# Patient Record
Sex: FEMALE | Race: WHITE
[De-identification: names, ages, dates, MRNs, and addresses within clinical notes are randomized per-mention and may not be internally consistent; named-entity substitution may affect disease eponyms.]

---

## 2017-07-22 ENCOUNTER — HOSPITAL ENCOUNTER (EMERGENCY)
Dept: HOSPITAL 8 - ED | Age: 22
Discharge: HOME | End: 2017-07-22
Payer: MEDICAID

## 2017-07-22 VITALS — SYSTOLIC BLOOD PRESSURE: 124 MMHG | DIASTOLIC BLOOD PRESSURE: 73 MMHG

## 2017-07-22 VITALS — BODY MASS INDEX: 28.65 KG/M2 | HEIGHT: 65 IN | WEIGHT: 171.96 LBS

## 2017-07-22 DIAGNOSIS — O26.891: Primary | ICD-10-CM

## 2017-07-22 DIAGNOSIS — M94.0: ICD-10-CM

## 2017-07-22 LAB — BUN SERPL-MCNC: 9 MG/DL (ref 7–18)

## 2017-07-22 PROCEDURE — 80048 BASIC METABOLIC PNL TOTAL CA: CPT

## 2017-07-22 PROCEDURE — 84703 CHORIONIC GONADOTROPIN ASSAY: CPT

## 2017-07-22 PROCEDURE — 82040 ASSAY OF SERUM ALBUMIN: CPT

## 2017-07-22 PROCEDURE — 84484 ASSAY OF TROPONIN QUANT: CPT

## 2017-07-22 PROCEDURE — 85379 FIBRIN DEGRADATION QUANT: CPT

## 2017-07-22 PROCEDURE — 71010: CPT

## 2017-07-22 PROCEDURE — 93005 ELECTROCARDIOGRAM TRACING: CPT

## 2017-07-22 PROCEDURE — 36415 COLL VENOUS BLD VENIPUNCTURE: CPT

## 2017-07-22 PROCEDURE — 85025 COMPLETE CBC W/AUTO DIFF WBC: CPT

## 2017-12-27 ENCOUNTER — NON-PROVIDER VISIT (OUTPATIENT)
Dept: OCCUPATIONAL MEDICINE | Facility: CLINIC | Age: 22
End: 2017-12-27

## 2017-12-27 DIAGNOSIS — Z11.1 ENCOUNTER FOR PPD TEST: ICD-10-CM

## 2017-12-27 PROCEDURE — 86580 TB INTRADERMAL TEST: CPT | Performed by: PREVENTIVE MEDICINE

## 2017-12-30 ENCOUNTER — NON-PROVIDER VISIT (OUTPATIENT)
Dept: URGENT CARE | Facility: CLINIC | Age: 22
End: 2017-12-30

## 2017-12-30 LAB — TB WHEAL 3D P 5 TU DIAM: NORMAL MM

## 2017-12-30 NOTE — PROGRESS NOTES
Tricia Rees is a 22 y.o. female here for a non-provider visit for PPD reading -- Step 1 of 2.      1.  Resulted in Epic under enter/edit results? Yes   2.  TB evaluation questionnaire scanned into chart and original given to patient?Yes      3. Was induration greater than 0 mm? No.    If Step 1 of 2, when is patient returning for second step (delete if N/A): 1/3/18    Routed to PCP? No

## 2018-01-04 ENCOUNTER — NON-PROVIDER VISIT (OUTPATIENT)
Dept: OCCUPATIONAL MEDICINE | Facility: CLINIC | Age: 23
End: 2018-01-04

## 2018-01-04 DIAGNOSIS — Z11.1 PPD SCREENING TEST: ICD-10-CM

## 2018-01-04 PROCEDURE — 86580 TB INTRADERMAL TEST: CPT | Performed by: PREVENTIVE MEDICINE

## 2018-01-05 PROCEDURE — 99285 EMERGENCY DEPT VISIT HI MDM: CPT

## 2018-01-06 ENCOUNTER — HOSPITAL ENCOUNTER (EMERGENCY)
Facility: MEDICAL CENTER | Age: 23
End: 2018-01-06
Attending: EMERGENCY MEDICINE
Payer: MEDICAID

## 2018-01-06 ENCOUNTER — APPOINTMENT (OUTPATIENT)
Dept: RADIOLOGY | Facility: MEDICAL CENTER | Age: 23
End: 2018-01-06
Attending: EMERGENCY MEDICINE
Payer: MEDICAID

## 2018-01-06 VITALS
HEART RATE: 73 BPM | RESPIRATION RATE: 20 BRPM | OXYGEN SATURATION: 99 % | HEIGHT: 65 IN | BODY MASS INDEX: 30.45 KG/M2 | TEMPERATURE: 98.1 F | DIASTOLIC BLOOD PRESSURE: 78 MMHG | WEIGHT: 182.76 LBS | SYSTOLIC BLOOD PRESSURE: 137 MMHG

## 2018-01-06 DIAGNOSIS — M62.838 MUSCLE SPASMS OF NECK: ICD-10-CM

## 2018-01-06 DIAGNOSIS — G44.209 TENSION HEADACHE: ICD-10-CM

## 2018-01-06 LAB
ALBUMIN SERPL BCP-MCNC: 4.2 G/DL (ref 3.2–4.9)
ALBUMIN/GLOB SERPL: 1.2 G/DL
ALP SERPL-CCNC: 55 U/L (ref 30–99)
ALT SERPL-CCNC: 16 U/L (ref 2–50)
AMPHET UR QL SCN: NEGATIVE
ANION GAP SERPL CALC-SCNC: 7 MMOL/L (ref 0–11.9)
APPEARANCE UR: ABNORMAL
AST SERPL-CCNC: 15 U/L (ref 12–45)
BACTERIA #/AREA URNS HPF: ABNORMAL /HPF
BARBITURATES UR QL SCN: NEGATIVE
BASOPHILS # BLD AUTO: 0.6 % (ref 0–1.8)
BASOPHILS # BLD: 0.07 K/UL (ref 0–0.12)
BENZODIAZ UR QL SCN: NEGATIVE
BILIRUB SERPL-MCNC: 0.2 MG/DL (ref 0.1–1.5)
BILIRUB UR QL STRIP.AUTO: NEGATIVE
BUN SERPL-MCNC: 10 MG/DL (ref 8–22)
BURR CELLS/RBC NFR CSF MANUAL: 0 %
BURR CELLS/RBC NFR CSF MANUAL: 1 %
BZE UR QL SCN: NEGATIVE
CALCIUM SERPL-MCNC: 9.3 MG/DL (ref 8.5–10.5)
CANNABINOIDS UR QL SCN: POSITIVE
CHLORIDE SERPL-SCNC: 108 MMOL/L (ref 96–112)
CLARITY CSF: CLEAR
CLARITY CSF: CLEAR
CO2 SERPL-SCNC: 23 MMOL/L (ref 20–33)
COLOR CSF: COLORLESS
COLOR CSF: COLORLESS
COLOR SPUN CSF: COLORLESS
COLOR SPUN CSF: COLORLESS
COLOR UR: YELLOW
CREAT SERPL-MCNC: 0.56 MG/DL (ref 0.5–1.4)
CULTURE IF INDICATED INDCX: YES UA CULTURE
EOSINOPHIL # BLD AUTO: 0.17 K/UL (ref 0–0.51)
EOSINOPHIL NFR BLD: 1.5 % (ref 0–6.9)
EPI CELLS #/AREA URNS HPF: ABNORMAL /HPF
ERYTHROCYTE [DISTWIDTH] IN BLOOD BY AUTOMATED COUNT: 46 FL (ref 35.9–50)
GFR SERPL CREATININE-BSD FRML MDRD: >60 ML/MIN/1.73 M 2
GLOBULIN SER CALC-MCNC: 3.4 G/DL (ref 1.9–3.5)
GLUCOSE CSF-MCNC: 69 MG/DL (ref 40–80)
GLUCOSE SERPL-MCNC: 105 MG/DL (ref 65–99)
GLUCOSE UR STRIP.AUTO-MCNC: NEGATIVE MG/DL
GRAM STN SPEC: NORMAL
HCT VFR BLD AUTO: 39 % (ref 37–47)
HGB BLD-MCNC: 12.7 G/DL (ref 12–16)
HYALINE CASTS #/AREA URNS LPF: ABNORMAL /LPF
IMM GRANULOCYTES # BLD AUTO: 0.04 K/UL (ref 0–0.11)
IMM GRANULOCYTES NFR BLD AUTO: 0.3 % (ref 0–0.9)
KETONES UR STRIP.AUTO-MCNC: NEGATIVE MG/DL
LEUKOCYTE ESTERASE UR QL STRIP.AUTO: NEGATIVE
LYMPHOCYTES # BLD AUTO: 2.93 K/UL (ref 1–4.8)
LYMPHOCYTES NFR BLD: 25 % (ref 22–41)
LYMPHOCYTES NFR CSF: 89 %
MCH RBC QN AUTO: 27.5 PG (ref 27–33)
MCHC RBC AUTO-ENTMCNC: 32.6 G/DL (ref 33.6–35)
MCV RBC AUTO: 84.4 FL (ref 81.4–97.8)
METHADONE UR QL SCN: NEGATIVE
MICRO URNS: ABNORMAL
MONOCYTES # BLD AUTO: 0.75 K/UL (ref 0–0.85)
MONOCYTES NFR BLD AUTO: 6.4 % (ref 0–13.4)
MONONUC CELLS NFR CSF: 10 %
NEUTROPHILS # BLD AUTO: 7.75 K/UL (ref 2–7.15)
NEUTROPHILS NFR BLD: 66.2 % (ref 44–72)
NEUTROPHILS NFR CSF: 1 %
NITRITE UR QL STRIP.AUTO: NEGATIVE
NRBC # BLD AUTO: 0 K/UL
NRBC BLD-RTO: 0 /100 WBC
OPIATES UR QL SCN: POSITIVE
OXYCODONE UR QL SCN: NEGATIVE
PCP UR QL SCN: NEGATIVE
PH UR STRIP.AUTO: 5 [PH]
PLATELET # BLD AUTO: 303 K/UL (ref 164–446)
PMV BLD AUTO: 10.6 FL (ref 9–12.9)
POTASSIUM SERPL-SCNC: 3.9 MMOL/L (ref 3.6–5.5)
PROPOXYPH UR QL SCN: NEGATIVE
PROT CSF-MCNC: 34 MG/DL (ref 15–45)
PROT SERPL-MCNC: 7.6 G/DL (ref 6–8.2)
PROT UR QL STRIP: NEGATIVE MG/DL
RBC # BLD AUTO: 4.62 M/UL (ref 4.2–5.4)
RBC # CSF: 0 CELLS/UL
RBC # CSF: <1 CELLS/UL
RBC # URNS HPF: ABNORMAL /HPF
RBC UR QL AUTO: NEGATIVE
SIGNIFICANT IND 70042: NORMAL
SITE SITE: NORMAL
SODIUM SERPL-SCNC: 138 MMOL/L (ref 135–145)
SOURCE SOURCE: NORMAL
SP GR UR STRIP.AUTO: 1.03
SPECIMEN VOL CSF: 5 ML
SPECIMEN VOL CSF: 5 ML
TUBE # CSF: 1
TUBE # CSF: 4
UROBILINOGEN UR STRIP.AUTO-MCNC: 0.2 MG/DL
WBC # BLD AUTO: 11.7 K/UL (ref 4.8–10.8)
WBC # CSF: 2 CELLS/UL (ref 0–10)
WBC # CSF: <1 CELLS/UL (ref 0–10)
WBC #/AREA URNS HPF: ABNORMAL /HPF

## 2018-01-06 PROCEDURE — 36415 COLL VENOUS BLD VENIPUNCTURE: CPT

## 2018-01-06 PROCEDURE — 70450 CT HEAD/BRAIN W/O DYE: CPT

## 2018-01-06 PROCEDURE — 87070 CULTURE OTHR SPECIMN AEROBIC: CPT

## 2018-01-06 PROCEDURE — 96361 HYDRATE IV INFUSION ADD-ON: CPT | Mod: XU

## 2018-01-06 PROCEDURE — 62270 DX LMBR SPI PNXR: CPT

## 2018-01-06 PROCEDURE — 80053 COMPREHEN METABOLIC PANEL: CPT

## 2018-01-06 PROCEDURE — 96375 TX/PRO/DX INJ NEW DRUG ADDON: CPT | Mod: XU

## 2018-01-06 PROCEDURE — 89051 BODY FLUID CELL COUNT: CPT

## 2018-01-06 PROCEDURE — 84157 ASSAY OF PROTEIN OTHER: CPT

## 2018-01-06 PROCEDURE — 85025 COMPLETE CBC W/AUTO DIFF WBC: CPT

## 2018-01-06 PROCEDURE — 700105 HCHG RX REV CODE 258: Performed by: EMERGENCY MEDICINE

## 2018-01-06 PROCEDURE — 700101 HCHG RX REV CODE 250

## 2018-01-06 PROCEDURE — 81001 URINALYSIS AUTO W/SCOPE: CPT | Mod: 59

## 2018-01-06 PROCEDURE — 80307 DRUG TEST PRSMV CHEM ANLYZR: CPT

## 2018-01-06 PROCEDURE — 82945 GLUCOSE OTHER FLUID: CPT

## 2018-01-06 PROCEDURE — 99284 EMERGENCY DEPT VISIT MOD MDM: CPT

## 2018-01-06 PROCEDURE — 700111 HCHG RX REV CODE 636 W/ 250 OVERRIDE (IP): Performed by: EMERGENCY MEDICINE

## 2018-01-06 PROCEDURE — 87205 SMEAR GRAM STAIN: CPT

## 2018-01-06 PROCEDURE — 96374 THER/PROPH/DIAG INJ IV PUSH: CPT | Mod: XU

## 2018-01-06 PROCEDURE — 87086 URINE CULTURE/COLONY COUNT: CPT

## 2018-01-06 RX ORDER — SODIUM CHLORIDE 9 MG/ML
1000 INJECTION, SOLUTION INTRAVENOUS ONCE
Status: COMPLETED | OUTPATIENT
Start: 2018-01-06 | End: 2018-01-06

## 2018-01-06 RX ORDER — DEXAMETHASONE SODIUM PHOSPHATE 4 MG/ML
10 INJECTION, SOLUTION INTRA-ARTICULAR; INTRALESIONAL; INTRAMUSCULAR; INTRAVENOUS; SOFT TISSUE ONCE
Status: COMPLETED | OUTPATIENT
Start: 2018-01-06 | End: 2018-01-06

## 2018-01-06 RX ORDER — CYCLOBENZAPRINE HCL 10 MG
10 TABLET ORAL EVERY 8 HOURS PRN
Qty: 21 TAB | Refills: 0 | Status: SHIPPED | OUTPATIENT
Start: 2018-01-06 | End: 2018-01-07

## 2018-01-06 RX ORDER — ONDANSETRON 2 MG/ML
4 INJECTION INTRAMUSCULAR; INTRAVENOUS ONCE
Status: COMPLETED | OUTPATIENT
Start: 2018-01-06 | End: 2018-01-06

## 2018-01-06 RX ORDER — MORPHINE SULFATE 10 MG/ML
6 INJECTION, SOLUTION INTRAMUSCULAR; INTRAVENOUS ONCE
Status: COMPLETED | OUTPATIENT
Start: 2018-01-06 | End: 2018-01-06

## 2018-01-06 RX ORDER — LIDOCAINE HYDROCHLORIDE 20 MG/ML
INJECTION, SOLUTION INFILTRATION; PERINEURAL
Status: COMPLETED
Start: 2018-01-06 | End: 2018-01-06

## 2018-01-06 RX ORDER — HYDROMORPHONE HYDROCHLORIDE 2 MG/ML
1 INJECTION, SOLUTION INTRAMUSCULAR; INTRAVENOUS; SUBCUTANEOUS ONCE
Status: COMPLETED | OUTPATIENT
Start: 2018-01-06 | End: 2018-01-06

## 2018-01-06 RX ORDER — NAPROXEN 500 MG/1
500 TABLET ORAL 2 TIMES DAILY WITH MEALS
Qty: 20 TAB | Refills: 0 | Status: SHIPPED | OUTPATIENT
Start: 2018-01-06 | End: 2018-01-07

## 2018-01-06 RX ORDER — KETOROLAC TROMETHAMINE 30 MG/ML
30 INJECTION, SOLUTION INTRAMUSCULAR; INTRAVENOUS ONCE
Status: COMPLETED | OUTPATIENT
Start: 2018-01-06 | End: 2018-01-06

## 2018-01-06 RX ADMIN — DEXAMETHASONE SODIUM PHOSPHATE 10 MG: 4 INJECTION, SOLUTION INTRAMUSCULAR; INTRAVENOUS at 06:10

## 2018-01-06 RX ADMIN — HYDROMORPHONE HYDROCHLORIDE 1 MG: 2 INJECTION INTRAMUSCULAR; INTRAVENOUS; SUBCUTANEOUS at 04:20

## 2018-01-06 RX ADMIN — SODIUM CHLORIDE 1000 ML: 9 INJECTION, SOLUTION INTRAVENOUS at 02:02

## 2018-01-06 RX ADMIN — KETOROLAC TROMETHAMINE 30 MG: 30 INJECTION, SOLUTION INTRAMUSCULAR at 05:45

## 2018-01-06 RX ADMIN — ONDANSETRON 4 MG: 2 INJECTION INTRAMUSCULAR; INTRAVENOUS at 02:02

## 2018-01-06 RX ADMIN — MORPHINE SULFATE 6 MG: 10 INJECTION INTRAVENOUS at 02:02

## 2018-01-06 RX ADMIN — LIDOCAINE HYDROCHLORIDE: 20 INJECTION, SOLUTION INFILTRATION; PERINEURAL at 04:30

## 2018-01-06 NOTE — ED NOTES
D/C home with written and verbal instructions re: Rx, activity, f/u.  Verbalizes understanding.  Patient wheeled out to lobby. Family outside waiting for patient.

## 2018-01-06 NOTE — DISCHARGE INSTRUCTIONS
Muscle Cramps and Spasms  Muscle cramps and spasms occur when a muscle or muscles tighten and you have no control over this tightening (involuntary muscle contraction). They are a common problem and can develop in any muscle. The most common place is in the calf muscles of the leg. Both muscle cramps and muscle spasms are involuntary muscle contractions, but they also have differences:   · Muscle cramps are sporadic and painful. They may last a few seconds to a quarter of an hour. Muscle cramps are often more forceful and last longer than muscle spasms.  · Muscle spasms may or may not be painful. They may also last just a few seconds or much longer.  CAUSES   It is uncommon for cramps or spasms to be due to a serious underlying problem. In many cases, the cause of cramps or spasms is unknown. Some common causes are:   · Overexertion.    · Overuse from repetitive motions (doing the same thing over and over).    · Remaining in a certain position for a long period of time.    · Improper preparation, form, or technique while performing a sport or activity.    · Dehydration.    · Injury.    · Side effects of some medicines.    · Abnormally low levels of the salts and ions in your blood (electrolytes), especially potassium and calcium. This could happen if you are taking water pills (diuretics) or you are pregnant.    Some underlying medical problems can make it more likely to develop cramps or spasms. These include, but are not limited to:   · Diabetes.    · Parkinson disease.    · Hormone disorders, such as thyroid problems.    · Alcohol abuse.    · Diseases specific to muscles, joints, and bones.    · Blood vessel disease where not enough blood is getting to the muscles.    HOME CARE INSTRUCTIONS   · Stay well hydrated. Drink enough water and fluids to keep your urine clear or pale yellow.  · It may be helpful to massage, stretch, and relax the affected muscle.  · For tight or tense muscles, use a warm towel, heating  pad, or hot shower water directed to the affected area.  · If you are sore or have pain after a cramp or spasm, applying ice to the affected area may relieve discomfort.  ¨ Put ice in a plastic bag.  ¨ Place a towel between your skin and the bag.  ¨ Leave the ice on for 15-20 minutes, 03-04 times a day.  · Medicines used to treat a known cause of cramps or spasms may help reduce their frequency or severity. Only take over-the-counter or prescription medicines as directed by your caregiver.  SEEK MEDICAL CARE IF:   Your cramps or spasms get more severe, more frequent, or do not improve over time.   MAKE SURE YOU:   · Understand these instructions.  · Will watch your condition.  · Will get help right away if you are not doing well or get worse.     This information is not intended to replace advice given to you by your health care provider. Make sure you discuss any questions you have with your health care provider.     Document Released: 06/09/2003 Document Revised: 04/14/2014 Document Reviewed: 12/04/2013  [employer] Interactive Patient Education ©2016 Else[employer] Inc.      Tension Headache  A tension headache is pain, pressure, or aching felt over the front and sides of the head. Tension headaches often come after stress, feeling worried (anxiety), or feeling sad or down for a while (depressed).  HOME CARE  · Only take medicine as told by your doctor.  · Lie down in a dark, quiet room when you have a headache.  · Keep a journal to find out if certain things bring on headaches. For example, write down:  ¨ What you eat and drink.  ¨ How much sleep you get.  ¨ Any change to your diet or medicines.  · Relax by getting a massage or doing other relaxing activities.  · Put ice or heat packs on the head and neck area as told by your doctor.  · Lessen stress.  · Sit up straight. Do not tighten (tense) your muscles.  · Quit smoking if you smoke.  · Lessen how much alcohol you drink.  · Lessen how much caffeine you drink, or stop  drinking caffeine.  · Eat and exercise regularly.  · Get enough sleep.  · Avoid using too much pain medicine.  GET HELP RIGHT AWAY IF:   · Your headache becomes really bad.  · You have a fever.  · You have a stiff neck.  · You have trouble seeing.  · Your muscles are weak, or you lose muscle control.  · You lose your balance or have trouble walking.  · You feel like you will pass out (faint), or you pass out.  · You have really bad symptoms that are different than your first symptoms.  · You have problems with the medicines given to you by your doctor.  · Your medicines do not work.  · Your headache feels different than the other headaches.  · You feel sick to your stomach (nauseous) or throw up (vomit).     This information is not intended to replace advice given to you by your health care provider. Make sure you discuss any questions you have with your health care provider.     Document Released: 03/14/2011 Document Revised: 05/03/2016 Document Reviewed: 04/11/2016  [employer] Interactive Patient Education ©2016 Elsevier Inc.

## 2018-01-06 NOTE — ED PROVIDER NOTES
ED Provider Note    ED Provider Note      Primary care provider: Pcp Pt States None    CHIEF COMPLAINT  Chief Complaint   Patient presents with   • Headache   • Neck Pain   • Nausea       HPI  Tricia Rees is a 22 y.o. female who presents to the Emergency Department Chief complaint of headache. Patient also reports extreme neck pain and stiffness. Patient stated the headache began slow this morning given the occiput and then radiated down both sides of her neck. She now has pain with any movement of the neck. She reports no vision changes or hearing changes but does report phonophobia and photophobia. She's had minor nausea is had no emesis she denies fevers but has had chills throughout the day. She reports multiple sick contacts. No lower back pain she has no chest pain or shortness breath no abdominal pain, diarrhea dysuria hematuria and melena hematochezia she denies chance pregnancy at this time. Her pain currently is rated as an 8 out of 10 with no alleviating factors.  Patient reported that she did smoke some marijuana prior to arrival in an attempt to alleviate pain with no success.    REVIEW OF SYSTEMS  10 systems reviewed and otherwise negative, pertinent positives and negatives listed in the history of present illness.    PAST MEDICAL HISTORY   has a past medical history of  treated with cerclage at 14 weeks gestation (1/12/2012); HBsAg (hepatitis B surface antigen) positive, currently pregnant (1/18/2012); Incompetent cervix in pregnancy (1/12/2012); and Powell cerclage present (1/12/2012).    SURGICAL HISTORY   has a past surgical history that includes cervical cerclage (  10/2011); cervical cerclage (3/24/2012); and primary c section (3/25/2012).    SOCIAL HISTORY  Social History   Substance Use Topics   • Smoking status: Never Smoker   • Smokeless tobacco: Not on file   • Alcohol use No      History   Drug Use No       FAMILY HISTORY  Non-Contributory    CURRENT MEDICATIONS  Home Medications      "Reviewed by Katie Hurley R.N. (Registered Nurse) on 01/06/18 at 0121  Med List Status: Complete   Medication Last Dose Status   ibuprofen (MOTRIN) 800 MG Tab not taking Active                ALLERGIES  Allergies   Allergen Reactions   • Nkda [No Known Drug Allergy]        PHYSICAL EXAM  VITAL SIGNS: /78   Pulse 88   Temp 36.7 °C (98.1 °F)   Resp 16   Ht 1.651 m (5' 5\")   Wt 82.9 kg (182 lb 12.2 oz)   LMP 07/10/2011   SpO2 99%   BMI 30.41 kg/m²   Pulse ox interpretation: I interpret this pulse ox as normal.  Constitutional: Alert and oriented x 3, moderate Distress  HEENT: Atraumatic normocephalic, pupils are equal round reactive to light extraocular movements are intact. The nares is clear, external ears are normal, mouth shows dry mucous membranes  Neck: Head held in fixed forward position. Pain with flexion extension or axial rotation no midline tenderness. Off no JVD no tracheal deviation  Cardiovascular: Regular rate and rhythm no murmur rub or gallop 2+ pulses peripherally x4  Thorax & Lungs: No respiratory distress, no wheezes rales or rhonchi, No chest tenderness.   GI: Soft nontender nondistended positive bowel sounds, no peritoneal signs  Skin: Warm dry no acute rash or lesion  Musculoskeletal: Moving all extremities with full range and 5 of 5 strength, no acute  deformity  Neurologic: Cranial nerves III through XII are grossly intact, no sensory deficit, no cerebellar dysfunction   Psychiatric: Appropriate affect for situation at this time      DIAGNOSTIC STUDIES / PROCEDURES  LABS  Results for orders placed or performed during the hospital encounter of 01/06/18   CBC WITH DIFFERENTIAL   Result Value Ref Range    WBC 11.7 (H) 4.8 - 10.8 K/uL    RBC 4.62 4.20 - 5.40 M/uL    Hemoglobin 12.7 12.0 - 16.0 g/dL    Hematocrit 39.0 37.0 - 47.0 %    MCV 84.4 81.4 - 97.8 fL    MCH 27.5 27.0 - 33.0 pg    MCHC 32.6 (L) 33.6 - 35.0 g/dL    RDW 46.0 35.9 - 50.0 fL    Platelet Count 303 164 - 446 K/uL "    MPV 10.6 9.0 - 12.9 fL    Neutrophils-Polys 66.20 44.00 - 72.00 %    Lymphocytes 25.00 22.00 - 41.00 %    Monocytes 6.40 0.00 - 13.40 %    Eosinophils 1.50 0.00 - 6.90 %    Basophils 0.60 0.00 - 1.80 %    Immature Granulocytes 0.30 0.00 - 0.90 %    Nucleated RBC 0.00 /100 WBC    Neutrophils (Absolute) 7.75 (H) 2.00 - 7.15 K/uL    Lymphs (Absolute) 2.93 1.00 - 4.80 K/uL    Monos (Absolute) 0.75 0.00 - 0.85 K/uL    Eos (Absolute) 0.17 0.00 - 0.51 K/uL    Baso (Absolute) 0.07 0.00 - 0.12 K/uL    Immature Granulocytes (abs) 0.04 0.00 - 0.11 K/uL    NRBC (Absolute) 0.00 K/uL   COMP METABOLIC PANEL   Result Value Ref Range    Sodium 138 135 - 145 mmol/L    Potassium 3.9 3.6 - 5.5 mmol/L    Chloride 108 96 - 112 mmol/L    Co2 23 20 - 33 mmol/L    Anion Gap 7.0 0.0 - 11.9    Glucose 105 (H) 65 - 99 mg/dL    Bun 10 8 - 22 mg/dL    Creatinine 0.56 0.50 - 1.40 mg/dL    Calcium 9.3 8.5 - 10.5 mg/dL    AST(SGOT) 15 12 - 45 U/L    ALT(SGPT) 16 2 - 50 U/L    Alkaline Phosphatase 55 30 - 99 U/L    Total Bilirubin 0.2 0.1 - 1.5 mg/dL    Albumin 4.2 3.2 - 4.9 g/dL    Total Protein 7.6 6.0 - 8.2 g/dL    Globulin 3.4 1.9 - 3.5 g/dL    A-G Ratio 1.2 g/dL   ESTIMATED GFR   Result Value Ref Range    GFR If African American >60 >60 mL/min/1.73 m 2    GFR If Non African American >60 >60 mL/min/1.73 m 2   CSF GLUCOSE   Result Value Ref Range    Glucose CSF 69 40 - 80 mg/dL   CSF PROTEIN   Result Value Ref Range    Total Protein, CSF 34 15 - 45 mg/dL   CSF CELL COUNT   Result Value Ref Range    Number Of Tubes 4     Volume 5.0 mL    Color-Body Fluid Colorless     Character-Body Fluid Clear     Supernatant Appearance Colorless     Total RBC Count <1 cells/uL    Crenated RBC 0 %    Total WBC Count <1 0 - 10 cells/uL    CSF Tube Number 1    URINALYSIS CULTURE, IF INDICATED   Result Value Ref Range    Color Yellow     Character Cloudy (A)     Specific Gravity 1.027 <1.035    Ph 5.0 5.0 - 8.0    Glucose Negative Negative mg/dL    Ketones  Negative Negative mg/dL    Protein Negative Negative mg/dL    Bilirubin Negative Negative    Urobilinogen, Urine 0.2 Negative    Nitrite Negative Negative    Leukocyte Esterase Negative Negative    Occult Blood Negative Negative    Micro Urine Req Microscopic     Culture Indicated Yes UA Culture   URINE DRUG SCREEN   Result Value Ref Range    Amphetamines Urine Negative Negative    Barbiturates Negative Negative    Benzodiazepines Negative Negative    Cocaine Metabolite Negative Negative    Methadone Negative Negative    Opiates Positive (A) Negative    Oxycodone Negative Negative    Phencyclidine -Pcp Negative Negative    Propoxyphene Negative Negative    Cannabinoid Metab Positive (A) Negative   URINE MICROSCOPIC (W/UA)   Result Value Ref Range    WBC 0-2 /hpf    RBC 0-2 /hpf    Bacteria Moderate (A) None /hpf    Epithelial Cells Few /hpf    Hyaline Cast 3-5 (A) /lpf   GRAM STAIN   Result Value Ref Range    Significant Indicator .     Source CSF     Site TAP     Gram Stain Result No organisms seen.        All labs reviewed by me.      RADIOLOGY  CT-HEAD W/O   Final Result      Normal CT scan of the head without contrast.               INTERPRETING LOCATION:  [address] [place] [address], [facility]        The radiologist's interpretation of all radiological studies have been reviewed by me.    COURSE & MEDICAL DECISION MAKING  Pertinent Labs & Imaging studies reviewed. (See chart for details)    1:15 AM - Patient seen and examined at bedside.     Lumbar Puncture Procedure Note    Indication: Suspected meningitis and worst headache of patient's life    Consent: The patient was counseled regarding the procedure, it's indications, risks, potential complications and alternatives and any questions were answered. Consent was obtained.    Procedure: The patient was placed in the sitting, supported by bed side stand, position and the appropriate landmarks were identified. The area was prepped and draped in the usual sterile fashion.  "Anesthesia was obtained using 5 cc of 1% Lidocaine without epinephrine. A spinal needle was inserted at the L3- L4 level with the stylet in place until spinal fluid was returned. Opening pressure was not measured. At this point 5.0 cc of clear cerebral spinal fluid was obtained and sent for appropriate testing. The stylet was then replaced and the needle was withdrawn. A sterile dressing was placed over the site and the patient was placed in the supine position.    The patient tolerated the procedure well.    Complications: None         Prescription monitoring program queried and unremarkable.    Patient noted to have slightly elevated blood pressure likely circumstantial secondary to presenting complaint. Referred to primary care physician for further evaluation.     Patient was given IV fluids based on dry mucous membranes inability to tolerate by mouth fluids     Medical Decision Making: Patient presents with posterior headache in her neck and stiff neck. Some concern for meningismus on exam. CT head unremarkable other labs as above. Lumbar puncture was performed uneventfully. CSF demonstrates normal protein and normal glucose no organisms on Gram stain and less than 1 RBC and WBC. Patient feeling much better after medications administered here. I think patient's symptoms likely resulting from tension headache and cervical muscle spasm. Patient will be given prescription for naproxen Flexeril as an outpatient. She is given instructions to take naproxen on full stomach and to avoid other NSAID use. Instructed to return immediately for worsening headache altered mental status confusion dizziness fever chills nausea vomiting or any other acute changes or symptoms of concern otherwise follow-up with primary care discharge home stable condition.  /78   Pulse 73   Temp 36.7 °C (98.1 °F)   Resp 15   Ht 1.651 m (5' 5\")   Wt 82.9 kg (182 lb 12.2 oz)   LMP 07/10/2011   SpO2 97%   Breastfeeding? Unknown   BMI " 30.41 kg/m²       Pcp Unknown    In 2 days      [facility], Emergency Dept  [address]  [place] [address] [phone]    immediately if symptoms worsen        FINAL IMPRESSION  1. Tension headache    2. Muscle spasms of neck          This dictation has been created using voice recognition software and/or scribes. The accuracy of the dictation is limited by the abilities of the software and the expertise of the scribes. I expect there may be some errors of grammar and possibly content. I made every attempt to manually correct the errors within my dictation. However, errors related to voice recognition software and/or scribes may still exist and should be interpreted within the appropriate context.

## 2018-01-06 NOTE — ED NOTES
Pt ambulatory to triage. C/o neck pain that has caused a severe headache w/ associated nausea. Pt is unable to touch chin to chest. Pt is sensitive to light, denies being sensitive to noise. Sx began this morning. Denies trauma to the area. No rashes or confusion. Pt has no been out of the country. Pt has been around a lot of sick people lately. Pt given mask.     Chief Complaint   Patient presents with   • Headache   • Neck Pain   • Nausea     Pt placed in lobby, updated on triage process. Pt educated to notified RN or triage tech if changes in condition.

## 2018-01-07 ENCOUNTER — HOSPITAL ENCOUNTER (EMERGENCY)
Facility: MEDICAL CENTER | Age: 23
End: 2018-01-07
Attending: EMERGENCY MEDICINE
Payer: MEDICAID

## 2018-01-07 VITALS
HEART RATE: 76 BPM | WEIGHT: 180 LBS | DIASTOLIC BLOOD PRESSURE: 78 MMHG | SYSTOLIC BLOOD PRESSURE: 130 MMHG | TEMPERATURE: 98.2 F | HEIGHT: 65 IN | OXYGEN SATURATION: 98 % | BODY MASS INDEX: 29.99 KG/M2 | RESPIRATION RATE: 19 BRPM

## 2018-01-07 DIAGNOSIS — R11.2 NAUSEA AND VOMITING, INTRACTABILITY OF VOMITING NOT SPECIFIED, UNSPECIFIED VOMITING TYPE: ICD-10-CM

## 2018-01-07 DIAGNOSIS — R51.9 NONINTRACTABLE HEADACHE, UNSPECIFIED CHRONICITY PATTERN, UNSPECIFIED HEADACHE TYPE: ICD-10-CM

## 2018-01-07 PROCEDURE — 700105 HCHG RX REV CODE 258: Performed by: EMERGENCY MEDICINE

## 2018-01-07 PROCEDURE — 96374 THER/PROPH/DIAG INJ IV PUSH: CPT

## 2018-01-07 PROCEDURE — 36415 COLL VENOUS BLD VENIPUNCTURE: CPT

## 2018-01-07 PROCEDURE — 96375 TX/PRO/DX INJ NEW DRUG ADDON: CPT

## 2018-01-07 PROCEDURE — 700111 HCHG RX REV CODE 636 W/ 250 OVERRIDE (IP): Performed by: EMERGENCY MEDICINE

## 2018-01-07 RX ORDER — SODIUM CHLORIDE 9 MG/ML
INJECTION, SOLUTION INTRAVENOUS CONTINUOUS
Status: DISCONTINUED | OUTPATIENT
Start: 2018-01-07 | End: 2018-01-07 | Stop reason: HOSPADM

## 2018-01-07 RX ORDER — ONDANSETRON 4 MG/1
4 TABLET, FILM COATED ORAL EVERY 8 HOURS PRN
Qty: 6 EACH | Refills: 0 | Status: ON HOLD | OUTPATIENT
Start: 2018-01-07 | End: 2019-07-12

## 2018-01-07 RX ORDER — ONDANSETRON 2 MG/ML
4 INJECTION INTRAMUSCULAR; INTRAVENOUS ONCE
Status: COMPLETED | OUTPATIENT
Start: 2018-01-07 | End: 2018-01-07

## 2018-01-07 RX ORDER — KETOROLAC TROMETHAMINE 10 MG/1
10 TABLET, FILM COATED ORAL EVERY 6 HOURS PRN
Qty: 22 TAB | Refills: 2 | Status: ON HOLD | OUTPATIENT
Start: 2018-01-07 | End: 2019-07-12

## 2018-01-07 RX ORDER — ACETAMINOPHEN 325 MG/1
650 TABLET ORAL EVERY 4 HOURS PRN
Status: ON HOLD | COMMUNITY
End: 2019-07-12

## 2018-01-07 RX ORDER — MORPHINE SULFATE 4 MG/ML
4 INJECTION, SOLUTION INTRAMUSCULAR; INTRAVENOUS ONCE
Status: COMPLETED | OUTPATIENT
Start: 2018-01-07 | End: 2018-01-07

## 2018-01-07 RX ADMIN — MORPHINE SULFATE 4 MG: 4 INJECTION INTRAVENOUS at 07:05

## 2018-01-07 RX ADMIN — SODIUM CHLORIDE 500 ML: 9 INJECTION, SOLUTION INTRAVENOUS at 07:05

## 2018-01-07 RX ADMIN — ONDANSETRON 4 MG: 2 INJECTION INTRAMUSCULAR; INTRAVENOUS at 07:04

## 2018-01-07 ASSESSMENT — LIFESTYLE VARIABLES: DO YOU DRINK ALCOHOL: NO

## 2018-01-07 ASSESSMENT — PAIN SCALES - GENERAL: PAINLEVEL_OUTOF10: 6

## 2018-01-07 NOTE — ED NOTES
Pt medicated see MAR, VSS, pt resting in bed. HA 9/10, Neck pains 7/10 unable to comfortably put chin to chest, upper back pains 10/10.

## 2018-01-07 NOTE — ED NOTES
PIV removed.  Patient given discharge instructions and follow up information, verbalized understanding, prescription x 2 electronically sent to pharmacy, location verified with patient, patient to take cab home.

## 2018-01-07 NOTE — CONSULTS
Anesthesiology Consult Note    S: Anesthesia service was consulted for concern for possible PDPH.  Patient was in ED yesterday 1/6/18 c/o HA, was sent home with Rx for analgesics after LP results were negative.  Patient did not get her Rx filled yet. She took a total of 2 tabs of tylenol 500mg yesterday with minimal relief.  She c/o persistent HA now, slightly worse in upright position, but a/w photosensitivity, intense neck and shoulder pain, dizziness and nausea.  O: patient was able to sit upright for several minutes while talking to me and her HA slowly worsened over that period.  She then lay down and her HA slowly improved. The pain didn't appear to be severe, nor was it precisely a/w position, therefore not entirely typical of a PDPH  A/P: the patient appears to have a complex HA that may have a component of PDPH, but also appears to have another etiology, possibly tension HA or something else entirely; will defer to ED physician for Ddx.  Even if it was a PDPH, patient expressed her desire to avoid another procedure if possible.  Therefore I recommend aggressive conservative mgmt for now, with ATC multimodal analgesia (1g acetaminophen and 800mg ibuprofen q6h, always taken with ample food and water and avoiding EtOH during tx).  If patient's HA does not improve with conservative mgmt, may re-consult anesthesia service to see if sx evolve into a clearer picture that would indicate PDPH and therefore possible tx with an epidural blood patch.      Thank you for the consult and feel free to contact us again with any further questions.    Sonja Garrett MD  [facility]

## 2018-01-07 NOTE — ED PROVIDER NOTES
ED Provider Note    CHIEF COMPLAINT  Chief Complaint   Patient presents with   • Headache     Per report from EMS, pt. was seen here this morning for same s/s and d/c. Pt. states severe headache, neck, and back pain to the point of throwing up. Per EMS, pt. has been forcing herself to throw up. Pt. sticking finger down throat in triage.   • Neck Pain     Pt. did not fill any prescriptions from earlier visit.   • N/V       HPI  Tricia Rees is a 22 y.o. female who presentsWith headache, for the last 24 hours, headache worse today. Worse when she stands up.. Nausea vomiting at 9 PM last vomiting here 1 episode of diarrhea    No GI blood no fever no chills slight neck pain. Evidently she was here yesterday received a spinal tap extensive workup was done yesterday was negative.. No gait problems. No speech problems. No vision problems.  Denies frequent headaches  REVIEW OF SYSTEMS  See HPI for further details. History of cerclage, states she currently pregnant.Denies other G.I., G.U.. endrocine, cardiovascular, respriatory or neurological problems.  All other systems are negative.     PAST MEDICAL HISTORY  Past Medical History:   Diagnosis Date   •  treated with cerclage at 14 weeks gestation 1/12/2012   • HBsAg (hepatitis B surface antigen) positive, currently pregnant ([facility]) 1/18/2012   • Incompetent cervix in pregnancy 1/12/2012   • Powell cerclage present 1/12/2012       FAMILY HISTORY  Family History   Problem Relation Age of Onset   • Diabetes Father      NIDDM       SOCIAL HISTORY  Social History     Social History   • Marital status: Single     Spouse name: N/A   • Number of children: N/A   • Years of education: N/A     Social History Main Topics   • Smoking status: Never Smoker   • Smokeless tobacco: Not on file   • Alcohol use No   • Drug use: No   • Sexual activity: Not Currently     Partners: Male      Comment: unplanned pregnancy FOB involved     Other Topics Concern   • Not on file     Social  "History Narrative   • No narrative on file       SURGICAL HISTORY  Past Surgical History:   Procedure Laterality Date   • PRIMARY C SECTION  3/25/2012    Performed by CHATO CONDE at LABOR AND DELIVERY   • CERVICAL CERCLAGE  3/24/2012    Performed by PRAKASH RICO at LABOR AND DELIVERY   • CERVICAL CERCLAGE    10/2011       CURRENT MEDICATIONS  Home Medications    **Home medications have not yet been reviewed for this encounter**         ALLERGIES  Allergies   Allergen Reactions   • Nkda [No Known Drug Allergy]        PHYSICAL EXAM  VITAL SIGNS: /78   Pulse 80   Temp 36.8 °C (98.2 °F)   Resp 18   Ht 1.651 m (5' 5\")   Wt 81.6 kg (180 lb)   LMP 07/10/2011   SpO2 97%   BMI 29.95 kg/m²    Constitutional: Well developed, Well nourished, No acute distress, Non-toxic appearance.   HENT: Normocephalic, Atraumatic, Bilateral external ears normal, Oropharynx moist, No oral exudates, Nose normal.   Eyes: PERRL, EOMI, Conjunctiva normal, No discharge.   Neck: Normal range of motion, No tenderness, Supple, No stridor. Neck is supple. He easily flexes her chin to her chest  Lymphatic: No lymphadenopathy noted.   Cardiovascular: Normal heart rate, Normal rhythm, No murmurs, No rubs, No gallops.   Thorax & Lungs: Normal breath sounds, No respiratory distress, No wheezing, No chest tenderness.   Abdomen:  No tenderness, no guarding no rigidity and the abdomen is soft.  No masses, No pulsatile masses.  Skin: Warm, Dry, No erythema, No rash.   Back: No tenderness, No CVA tenderness.   Extremities: Intact distal pulses, No edema, No tenderness, No cyanosis, No clubbing.   Musculoskeletal: Good range of motion in all major joints. No tenderness to palpation or major deformities noted.   Neurologic: Alert & oriented x 3, Normal motor function, Normal sensory function, No focal deficits noted. Gait normal. Speech normal. Vision normal. The neck is supple,  Psychiatric: Affect normal, Judgment normal, Mood normal. "       RADIOLOGY/PROCEDURES      COURSE & MEDICAL DECISION MAKING  Pertinent Labs & Imaging studies reviewed. (See chart for details)    She comes in with a headache today, extensive workup for headache that yesterday was negative. Is given IV normal saline morphine and Zofran    Anesthesiologist does not feel this is a post puncture headache, analgesics will be given, Toradol. Zofran for any nausea this patient has been treated here for several hours with morphine, Zofran, fluids, feeling much better by 11 AM, pain almost gone  FINAL IMPRESSION  1.   1. Nonintractable headache, unspecified chronicity pattern, unspecified headache type    2. Nausea and vomiting, intractability of vomiting not specified, unspecified vomiting type            2.   3.     Disposition  Discharge instructions are understood. This patient is to return if fever vomiting or no better in 12 hours. Follow up with the [facility] clinic or private physician. Information sheets on headache and vomiting  Electronically signed by: Ej Peralta, 1/7/2018 5:36 AM

## 2018-01-07 NOTE — ED NOTES
"Tricia Rees  22 y.o. female  Chief Complaint   Patient presents with   • Headache     Per report from EMS, pt. was seen here this morning for same s/s and d/c. Pt. states severe headache, neck, and back pain to the point of throwing up. Per EMS, pt. has been forcing herself to throw up. Pt. sticking finger down throat in triage.   • Neck Pain     Pt. did not fill any prescriptions from earlier visit.   • N/V     /78   Pulse 80   Temp 36.8 °C (98.2 °F)   Resp 18   Ht 1.651 m (5' 5\")   Wt 81.6 kg (180 lb)   LMP 07/10/2011   SpO2 97%   BMI 29.95 kg/m²     Pt amb to triage via wheelchair.   Pt is alert and oriented, speaking in full sentences, follows commands and responds appropriately to questions. NAD. Resp are even and unlabored.  Pt placed in lobby. Pt educated on triage process. Pt encouraged to alert staff for any changes.  "

## 2018-01-07 NOTE — DISCHARGE INSTRUCTIONS
"Headaches, Frequently Asked Questions  MIGRAINE HEADACHES  Q: What is migraine? What causes it? How can I treat it?  A: Generally, migraine headaches begin as a dull ache. Then they develop into a constant, throbbing, and pulsating pain. You may experience pain at the temples. You may experience pain at the front or back of one or both sides of the head. The pain is usually accompanied by a combination of:  · Nausea.   · Vomiting.   · Sensitivity to light and noise.   Some people (about 15%) experience an aura (see below) before an attack. The cause of migraine is believed to be chemical reactions in the brain. Treatment for migraine may include over-the-counter or prescription medications. It may also include self-help techniques. These include relaxation training and biofeedback.   Q: What is an aura?  A: About 15% of people with migraine get an \"aura\". This is a sign of neurological symptoms that occur before a migraine headache. You may see wavy or jagged lines, dots, or flashing lights. You might experience tunnel vision or blind spots in one or both eyes. The aura can include visual or auditory hallucinations (something imagined). It may include disruptions in smell (such as strange odors), taste or touch. Other symptoms include:  · Numbness.   · A \"pins and needles\" sensation.   · Difficulty in recalling or speaking the correct word.   These neurological events may last as long as 60 minutes. These symptoms will fade as the headache begins.  Q: What is a trigger?  A: Certain physical or environmental factors can lead to or \"trigger\" a migraine. These include:  · Foods.   · Hormonal changes.   · Weather.   · Stress.   It is important to remember that triggers are different for everyone. To help prevent migraine attacks, you need to figure out which triggers affect you. Keep a headache diary. This is a good way to track triggers. The diary will help you talk to your healthcare professional about your " "condition.  Q: Does weather affect migraines?  A: Bright sunshine, hot, humid conditions, and drastic changes in barometric pressure may lead to, or \"trigger,\" a migraine attack in some people. But studies have shown that weather does not act as a trigger for everyone with migraines.  Q: What is the link between migraine and hormones?  A: Hormones start and regulate many of your body's functions. Hormones keep your body in balance within a constantly changing environment. The levels of hormones in your body are unbalanced at times. Examples are during menstruation, pregnancy, or menopause. That can lead to a migraine attack. In fact, about three quarters of all women with migraine report that their attacks are related to the menstrual cycle.   Q: Is there an increased risk of stroke for migraine sufferers?  A: The likelihood of a migraine attack causing a stroke is very remote. That is not to say that migraine sufferers cannot have a stroke associated with their migraines. In persons under age 40, the most common associated factor for stroke is migraine headache. But over the course of a person's normal life span, the occurrence of migraine headache may actually be associated with a reduced risk of dying from cerebrovascular disease due to stroke.   Q: What are acute medications for migraine?  A: Acute medications are used to treat the pain of the headache after it has started. Examples over-the-counter medications, NSAIDs, ergots, and triptans.   Q: What are the triptans?  A: Triptans are the newest class of abortive medications. They are specifically targeted to treat migraine. Triptans are vasoconstrictors. They moderate some chemical reactions in the brain. The triptans work on receptors in your brain. Triptans help to restore the balance of a neurotransmitter called serotonin. Fluctuations in levels of serotonin are thought to be a main cause of migraine.   Q: Are over-the-counter medications for migraine " "effective?  A: Over-the-counter, or \"OTC,\" medications may be effective in relieving mild to moderate pain and associated symptoms of migraine. But you should see your caregiver before beginning any treatment regimen for migraine.   Q: What are preventive medications for migraine?  A: Preventive medications for migraine are sometimes referred to as \"prophylactic\" treatments. They are used to reduce the frequency, severity, and length of migraine attacks. Examples of preventive medications include antiepileptic medications, antidepressants, beta-blockers, calcium channel blockers, and NSAIDs (nonsteroidal anti-inflammatory drugs).  Q: Why are anticonvulsants used to treat migraine?  A: During the past few years, there has been an increased interest in antiepileptic drugs for the prevention of migraine. They are sometimes referred to as \"anticonvulsants\". Both epilepsy and migraine may be caused by similar reactions in the brain.   Q: Why are antidepressants used to treat migraine?  A: Antidepressants are typically used to treat people with depression. They may reduce migraine frequency by regulating chemical levels, such as serotonin, in the brain.   Q: What alternative therapies are used to treat migraine?  A: The term \"alternative therapies\" is often used to describe treatments considered outside the scope of conventional Western medicine. Examples of alternative therapy include acupuncture, acupressure, and yoga. Another common alternative treatment is herbal therapy. Some herbs are believed to relieve headache pain. Always discuss alternative therapies with your caregiver before proceeding. Some herbal products contain arsenic and other toxins.  TENSION HEADACHES  Q: What is a tension-type headache? What causes it? How can I treat it?  A: Tension-type headaches occur randomly. They are often the result of temporary stress, anxiety, fatigue, or anger. Symptoms include soreness in your temples, a tightening " "band-like sensation around your head (a \"vice-like\" ache). Symptoms can also include a pulling feeling, pressure sensations, and lucita head and neck muscles. The headache begins in your forehead, temples, or the back of your head and neck. Treatment for tension-type headache may include over-the-counter or prescription medications. Treatment may also include self-help techniques such as relaxation training and biofeedback.  CLUSTER HEADACHES  Q: What is a cluster headache? What causes it? How can I treat it?  A: Cluster headache gets its name because the attacks come in groups. The pain arrives with little, if any, warning. It is usually on one side of the head. A tearing or bloodshot eye and a runny nose on the same side of the headache may also accompany the pain. Cluster headaches are believed to be caused by chemical reactions in the brain. They have been described as the most severe and intense of any headache type. Treatment for cluster headache includes prescription medication and oxygen.  SINUS HEADACHES  Q: What is a sinus headache? What causes it? How can I treat it?  A: When a cavity in the bones of the face and skull (a sinus) becomes inflamed, the inflammation will cause localized pain. This condition is usually the result of an allergic reaction, a tumor, or an infection. If your headache is caused by a sinus blockage, such as an infection, you will probably have a fever. An x-ray will confirm a sinus blockage. Your caregiver's treatment might include antibiotics for the infection, as well as antihistamines or decongestants.   REBOUND HEADACHES  Q: What is a rebound headache? What causes it? How can I treat it?  A: A pattern of taking acute headache medications too often can lead to a condition known as \"rebound headache.\" A pattern of taking too much headache medication includes taking it more than 2 days per week or in excessive amounts. That means more than the label or a caregiver advises. " With rebound headaches, your medications not only stop relieving pain, they actually begin to cause headaches. Doctors treat rebound headache by tapering the medication that is being overused. Sometimes your caregiver will gradually substitute a different type of treatment or medication. Stopping may be a challenge. Regularly overusing a medication increases the potential for serious side effects. Consult a caregiver if you regularly use headache medications more than 2 days per week or more than the label advises.  ADDITIONAL QUESTIONS AND ANSWERS  Q: What is biofeedback?  A: Biofeedback is a self-help treatment. Biofeedback uses special equipment to monitor your body's involuntary physical responses. Biofeedback monitors:  · Breathing.   · Pulse.   · Heart rate.   · Temperature.   · Muscle tension.   · Brain activity.   Biofeedback helps you refine and perfect your relaxation exercises. You learn to control the physical responses that are related to stress. Once the technique has been mastered, you do not need the equipment any more.  Q: Are headaches hereditary?  A: Four out of five (80%) of people that suffer report a family history of migraine. Scientists are not sure if this is genetic or a family predisposition. Despite the uncertainty, a child has a 50% chance of having migraine if one parent suffers. The child has a 75% chance if both parents suffer.   Q: Can children get headaches?  A: By the time they reach high school, most young people have experienced some type of headache. Many safe and effective approaches or medications can prevent a headache from occurring or stop it after it has begun.   Q: What type of doctor should I see to diagnose and treat my headache?  A: Start with your primary caregiver. Discuss his or her experience and approach to headaches. Discuss methods of classification, diagnosis, and treatment. Your caregiver may decide to recommend you to a headache specialist, depending upon  your symptoms or other physical conditions. Having diabetes, allergies, etc., may require a more comprehensive and inclusive approach to your headache. The National Head[employer] Foundation will provide, upon request, a list of NHF physician members in your state.  Document Released: 03/09/2005 Document Revised: 03/11/2013 Document Reviewed: 08/17/2009  [employer]® Patient Information ©2013 [employer], [employer].Return if fever, vomiting or if no better in 12 hours.

## 2018-01-08 LAB
BACTERIA UR CULT: NORMAL
SIGNIFICANT IND 70042: NORMAL
SITE SITE: NORMAL
SOURCE SOURCE: NORMAL

## 2018-01-09 LAB
BACTERIA CSF CULT: NORMAL
GRAM STN SPEC: NORMAL
SIGNIFICANT IND 70042: NORMAL
SITE SITE: NORMAL
SOURCE SOURCE: NORMAL

## 2018-01-13 ENCOUNTER — NON-PROVIDER VISIT (OUTPATIENT)
Dept: URGENT CARE | Facility: CLINIC | Age: 23
End: 2018-01-13

## 2018-01-13 DIAGNOSIS — Z11.1 PPD SCREENING TEST: ICD-10-CM

## 2018-01-13 PROCEDURE — 86580 TB INTRADERMAL TEST: CPT | Performed by: FAMILY MEDICINE

## 2018-01-14 NOTE — PATIENT INSTRUCTIONS
Tricia Rees is a 22 y.o. female here for a non-provider visit for PPD placement -- Step 2 of 2    Reason for PPD:  work requirement    1. TB evaluation questionnaire completed by patient? Yes      -  If any answers marked yes did you contact a provider prior to placing? Not Indicated  2.  Patient notified to return to clinic for reading on: 1/15/18-1/16/18  3.  PPD Placement documentation completed on TB evaluation questionnaire? Yes  4.  Location of TB evaluation questionnaire filed: [facility]

## 2018-01-16 ENCOUNTER — APPOINTMENT (OUTPATIENT)
Dept: OCCUPATIONAL MEDICINE | Facility: CLINIC | Age: 23
End: 2018-01-16

## 2018-01-20 ENCOUNTER — NON-PROVIDER VISIT (OUTPATIENT)
Dept: URGENT CARE | Facility: CLINIC | Age: 23
End: 2018-01-20
Payer: MEDICAID

## 2018-01-20 DIAGNOSIS — Z11.1 PPD SCREENING TEST: ICD-10-CM

## 2018-01-20 PROCEDURE — 86580 TB INTRADERMAL TEST: CPT | Performed by: NURSE PRACTITIONER

## 2018-01-22 ENCOUNTER — NON-PROVIDER VISIT (OUTPATIENT)
Dept: OCCUPATIONAL MEDICINE | Facility: CLINIC | Age: 23
End: 2018-01-22

## 2018-01-22 DIAGNOSIS — Z11.1 ENCOUNTER FOR PPD SKIN TEST READING: ICD-10-CM

## 2018-01-22 LAB — TB WHEAL 3D P 5 TU DIAM: NORMAL MM

## 2018-06-19 ENCOUNTER — NON-PROVIDER VISIT (OUTPATIENT)
Dept: OBGYN | Facility: CLINIC | Age: 23
End: 2018-06-19
Payer: MEDICAID

## 2018-06-19 DIAGNOSIS — Z32.01 POSITIVE PREGNANCY TEST: ICD-10-CM

## 2018-06-19 LAB
INT CON NEG: NEGATIVE
INT CON POS: POSITIVE
POC URINE PREGNANCY TEST: NEGATIVE

## 2018-06-19 PROCEDURE — 81025 URINE PREGNANCY TEST: CPT | Performed by: OBSTETRICS & GYNECOLOGY

## 2018-06-20 ENCOUNTER — HOSPITAL ENCOUNTER (EMERGENCY)
Dept: HOSPITAL 8 - ED | Age: 23
Discharge: HOME | End: 2018-06-20
Payer: MEDICAID

## 2018-06-20 VITALS — BODY MASS INDEX: 29.2 KG/M2 | WEIGHT: 175.27 LBS | HEIGHT: 65 IN

## 2018-06-20 VITALS — SYSTOLIC BLOOD PRESSURE: 121 MMHG | DIASTOLIC BLOOD PRESSURE: 82 MMHG

## 2018-06-20 DIAGNOSIS — R10.30: Primary | ICD-10-CM

## 2018-06-20 DIAGNOSIS — N39.0: ICD-10-CM

## 2018-06-20 LAB
CULTURE INDICATED?: YES
HCG UR SG: 1.02 (ref 1–1.03)
MICROSCOPIC: (no result)

## 2018-06-20 PROCEDURE — 99284 EMERGENCY DEPT VISIT MOD MDM: CPT

## 2018-06-20 PROCEDURE — 81001 URINALYSIS AUTO W/SCOPE: CPT

## 2018-06-20 PROCEDURE — 81025 URINE PREGNANCY TEST: CPT

## 2018-06-20 PROCEDURE — 87086 URINE CULTURE/COLONY COUNT: CPT

## 2019-01-09 ENCOUNTER — HOSPITAL ENCOUNTER (EMERGENCY)
Facility: MEDICAL CENTER | Age: 24
End: 2019-01-09
Attending: EMERGENCY MEDICINE
Payer: MEDICAID

## 2019-01-09 VITALS
SYSTOLIC BLOOD PRESSURE: 123 MMHG | OXYGEN SATURATION: 100 % | BODY MASS INDEX: 28 KG/M2 | TEMPERATURE: 98 F | HEART RATE: 103 BPM | WEIGHT: 164.02 LBS | HEIGHT: 64 IN | RESPIRATION RATE: 13 BRPM | DIASTOLIC BLOOD PRESSURE: 79 MMHG

## 2019-01-09 DIAGNOSIS — R42 ORTHOSTATIC DIZZINESS: ICD-10-CM

## 2019-01-09 DIAGNOSIS — I95.1 ORTHOSTASIS: ICD-10-CM

## 2019-01-09 DIAGNOSIS — R55 NEAR SYNCOPE: ICD-10-CM

## 2019-01-09 LAB
ANION GAP SERPL CALC-SCNC: 7 MMOL/L (ref 0–11.9)
APPEARANCE UR: CLEAR
BACTERIA #/AREA URNS HPF: NEGATIVE /HPF
BILIRUB UR QL STRIP.AUTO: NEGATIVE
BUN SERPL-MCNC: 13 MG/DL (ref 8–22)
CALCIUM SERPL-MCNC: 9.2 MG/DL (ref 8.5–10.5)
CHLORIDE SERPL-SCNC: 107 MMOL/L (ref 96–112)
CO2 SERPL-SCNC: 23 MMOL/L (ref 20–33)
COLOR UR: YELLOW
CREAT SERPL-MCNC: 0.54 MG/DL (ref 0.5–1.4)
EKG IMPRESSION: NORMAL
EPI CELLS #/AREA URNS HPF: NEGATIVE /HPF
ERYTHROCYTE [DISTWIDTH] IN BLOOD BY AUTOMATED COUNT: 47.1 FL (ref 35.9–50)
GLUCOSE SERPL-MCNC: 80 MG/DL (ref 65–99)
GLUCOSE UR STRIP.AUTO-MCNC: NEGATIVE MG/DL
HCG UR QL: NEGATIVE
HCT VFR BLD AUTO: 41.6 % (ref 37–47)
HGB BLD-MCNC: 13.1 G/DL (ref 12–16)
HYALINE CASTS #/AREA URNS LPF: ABNORMAL /LPF
KETONES UR STRIP.AUTO-MCNC: NEGATIVE MG/DL
LEUKOCYTE ESTERASE UR QL STRIP.AUTO: ABNORMAL
MCH RBC QN AUTO: 26.5 PG (ref 27–33)
MCHC RBC AUTO-ENTMCNC: 31.5 G/DL (ref 33.6–35)
MCV RBC AUTO: 84.2 FL (ref 81.4–97.8)
MICRO URNS: ABNORMAL
NITRITE UR QL STRIP.AUTO: NEGATIVE
PH UR STRIP.AUTO: 6 [PH]
PLATELET # BLD AUTO: 345 K/UL (ref 164–446)
PMV BLD AUTO: 10.3 FL (ref 9–12.9)
POTASSIUM SERPL-SCNC: 4.1 MMOL/L (ref 3.6–5.5)
PROT UR QL STRIP: NEGATIVE MG/DL
RBC # BLD AUTO: 4.94 M/UL (ref 4.2–5.4)
RBC # URNS HPF: ABNORMAL /HPF
RBC UR QL AUTO: NEGATIVE
SODIUM SERPL-SCNC: 137 MMOL/L (ref 135–145)
SP GR UR REFRACTOMETRY: 1.01
SP GR UR STRIP.AUTO: 1.01
UROBILINOGEN UR STRIP.AUTO-MCNC: 0.2 MG/DL
WBC # BLD AUTO: 6.2 K/UL (ref 4.8–10.8)
WBC #/AREA URNS HPF: ABNORMAL /HPF

## 2019-01-09 PROCEDURE — 80048 BASIC METABOLIC PNL TOTAL CA: CPT

## 2019-01-09 PROCEDURE — A9270 NON-COVERED ITEM OR SERVICE: HCPCS | Performed by: EMERGENCY MEDICINE

## 2019-01-09 PROCEDURE — 85027 COMPLETE CBC AUTOMATED: CPT

## 2019-01-09 PROCEDURE — 93005 ELECTROCARDIOGRAM TRACING: CPT

## 2019-01-09 PROCEDURE — 700105 HCHG RX REV CODE 258: Performed by: EMERGENCY MEDICINE

## 2019-01-09 PROCEDURE — 81001 URINALYSIS AUTO W/SCOPE: CPT

## 2019-01-09 PROCEDURE — 81025 URINE PREGNANCY TEST: CPT

## 2019-01-09 PROCEDURE — 700102 HCHG RX REV CODE 250 W/ 637 OVERRIDE(OP): Performed by: EMERGENCY MEDICINE

## 2019-01-09 PROCEDURE — 93005 ELECTROCARDIOGRAM TRACING: CPT | Performed by: EMERGENCY MEDICINE

## 2019-01-09 PROCEDURE — 99285 EMERGENCY DEPT VISIT HI MDM: CPT

## 2019-01-09 RX ORDER — SODIUM CHLORIDE 9 MG/ML
1000 INJECTION, SOLUTION INTRAVENOUS ONCE
Status: COMPLETED | OUTPATIENT
Start: 2019-01-09 | End: 2019-01-09

## 2019-01-09 RX ORDER — ACETAMINOPHEN 325 MG/1
650 TABLET ORAL ONCE
Status: COMPLETED | OUTPATIENT
Start: 2019-01-09 | End: 2019-01-09

## 2019-01-09 RX ADMIN — ACETAMINOPHEN 650 MG: 325 TABLET, FILM COATED ORAL at 10:52

## 2019-01-09 RX ADMIN — SODIUM CHLORIDE 1000 ML: 9 INJECTION, SOLUTION INTRAVENOUS at 11:30

## 2019-01-09 ASSESSMENT — PAIN SCALES - GENERAL: PAINLEVEL_OUTOF10: 5

## 2019-01-09 NOTE — ED NOTES
Discharge teaching provided with pt stated understanding. Pt was advised to follow up in the ED if the symptoms worsen.

## 2019-01-09 NOTE — ED NOTES
Pt brought to room, changed into gown, provided call light. Pt reports having syncopal episodes where she remains awake and everything goes into slow motion and she is awake the whole time but becomes dizzy and falls down.

## 2019-01-09 NOTE — ED PROVIDER NOTES
"ED Provider Note    Scribed for Abiodun Echevarria M.D. by Brittany Mcintyre. 1/9/2019  10:18 AM    Primary care provider: Pcp Pt States None  Means of arrival: walk-in  History obtained from: Patient  History limited by: None    CHIEF COMPLAINT  Chief Complaint   Patient presents with   • Syncope     this AM after showering        [facility]  Tricia Rees is a 23 y.o. female who presents to the Emergency Department for a near syncopal episode this morning after showering. She got up from bed this morning, showered, and when she got out of the shower she felt light-headed and thought she was going to be incontinent both urinary and bowel. She described the episode as \"passing out\" but there was no actual loss of consciousness. She then sank to the floor and felt she couldn't move, but remembers the entire event. She denies any preceding symptoms and states she felt normal at baseline yesterday. At this time she complains of a mild headache and occasional nausea. She had a similar episode 1 year ago with near-syncope, with no follow up at that time. She states she finished her LMP yesterday, which was heavier than normal. She states she occasionally feels light-headed when she is sitting and stands quickly. She reports she stopped taking her phentermine 4 days ago and denies any recent head injuries.    REVIEW OF SYSTEMS  Pertinent positives include near syncope, light-headedness, nausea, headache.   Pertinent negatives include no abdominal pain, loss of consciousness.    All other systems reviewed and negative. See [facility] for further details.       PAST MEDICAL HISTORY   has a past medical history of  treated with cerclage at 14 weeks gestation (1/12/2012); HBsAg (hepatitis B surface antigen) positive, currently pregnant (HCC) (1/18/2012); Incompetent cervix in pregnancy (1/12/2012); and Powell cerclage present (1/12/2012).    SURGICAL HISTORY   has a past surgical history that includes cervical cerclage (  10/2011); " "cervical cerclage (3/24/2012); and primary c section (3/25/2012).    SOCIAL HISTORY  Social History   Substance Use Topics   • Smoking status: Never Smoker   • Smokeless tobacco: Never Used   • Alcohol use No      History   Drug Use   • Types: Inhaled     Comment: THC for menstral pains       FAMILY HISTORY  Family History   Problem Relation Age of Onset   • Diabetes Father         NIDDM       CURRENT MEDICATIONS  Home Medications     Reviewed by Waleska Rand R.N. (Registered Nurse) on 01/09/19 at 0957  Med List Status: Not Addressed   Medication Last Dose Status   acetaminophen (TYLENOL) 325 MG Tab  Active   ketorolac (TORADOL) 10 MG Tab  Active   ondansetron (ZOFRAN) 4 MG Tab tablet  Active                ALLERGIES  Allergies   Allergen Reactions   • Nkda [No Known Drug Allergy]        PHYSICAL EXAM  VITAL SIGNS: /69   Pulse 77   Temp 36.7 °C (98 °F) (Temporal)   Resp 16   Ht 1.626 m (5' 4\")   Wt 74.4 kg (164 lb 0.4 oz)   SpO2 96%   BMI 28.15 kg/m²     Nursing note and vitals reviewed.  Constitutional: Well-developed and well-nourished. No distress.   HENT: Head is normocephalic and atraumatic. Oropharynx is clear and moist without exudate or erythema.   Eyes: Pupils are equal, round, and reactive to light. Conjunctiva are normal.   Cardiovascular: Normal rate and regular rhythm. No murmur heard. Normal radial pulses.  Pulmonary/Chest: Breath sounds normal. No wheezes or rales.   Abdominal: Soft and non-tender. No distention    Musculoskeletal: Extremities exhibit normal range of motion without edema or tenderness.   Neurological: Awake, alert and oriented to person, place, and time. No focal deficits noted.  Skin: Skin is warm and dry. No rash.   Psychiatric: Normal mood and affect. Appropriate for clinical situation.    DIAGNOSTIC STUDIES / PROCEDURES    EKG Interpretation  Results for orders placed or performed during the hospital encounter of 01/09/19   EKG (NOW)   Result Value Ref Range    " Report       [facility] Emergency Dept.    Test Date:  2019  Pt Name:    SHERLYN JIMENEZ               Department: ER  MRN:        [id number]                      Room:  Gender:     Female                       Technician: [id number]  :        1995                   Requested By:ER TRIAGE PROTOCOL  Order #:    [id number]                    Reading MD: LILIANA GLASS MD    Measurements  Intervals                                Axis  Rate:       70                           P:          -24  NM:         140                          QRS:        52  QRSD:       98                           T:          16  QT:         404  QTc:        436    Interpretive Statements  SINUS RHYTHM  Compared to ECG 2012 03:40:29  No significant changes    Electronically Signed On 2019 10:16:39 PST by LILIANA GLASS MD         LABS  Results for orders placed or performed during the hospital encounter of 19   CBC WITHOUT DIFFERENTIAL   Result Value Ref Range    WBC 6.2 4.8 - 10.8 K/uL    RBC 4.94 4.20 - 5.40 M/uL    Hemoglobin 13.1 12.0 - 16.0 g/dL    Hematocrit 41.6 37.0 - 47.0 %    MCV 84.2 81.4 - 97.8 fL    MCH 26.5 (L) 27.0 - 33.0 pg    MCHC 31.5 (L) 33.6 - 35.0 g/dL    RDW 47.1 35.9 - 50.0 fL    Platelet Count 345 164 - 446 K/uL    MPV 10.3 9.0 - 12.9 fL   EKG (NOW)   Result Value Ref Range    Report       [facility] Emergency Dept.    Test Date:  2019  Pt Name:    SHERLYN JIMENEZ               Department: ER  MRN:        [id number]                      Room:  Gender:     Female                       Technician: [id number]  :        1995                   Requested By:ER TRIAGE PROTOCOL  Order #:    [id number]                    Reading MD: LILIANA GLASS MD    Measurements  Intervals                                Axis  Rate:       70                           P:          -24  NM:         140                          QRS:        52  QRSD:       98                            T:          16  QT:         404  QTc:        436    Interpretive Statements  SINUS RHYTHM  Compared to ECG 03/24/2012 03:40:29  No significant changes    Electronically Signed On 1-9-2019 10:16:39 PST by LILIANA GLASS MD         All labs reviewed by me.    COURSE & MEDICAL DECISION MAKING  Nursing notes, VS, PMSFHx reviewed in chart.     Review of past medical records shows the patient was last seen at [facility] 1 year ago for a headache.     10:18 AM - Patient seen and examined at bedside. Patient treated with 650 mg Tylenol. Ordered EKG, BMP, CBC, POC UA to evaluate her symptoms. I informed her that we would test for anemia and pregnancy.    The differential diagnoses include but are not limited to: Orthostatic hypotension, arrhythmia, vasovagal syncope.    Patient has significant tachycardia with standing.  Consistent with orthostasis.  She will be given a liter bolus in the emergency department.  Orthostasis precautions discussed.  Patient will increase her volume intake.  She will follow-up with primary care.    HYDRATION: Based on the patient's presentation of Other Orthostasis the patient was given IV fluids. IV Hydration was used because oral hydration was not adequate alone. Upon recheck following hydration, the patient was Significantly improved.    The patient will return for new or worsening symptoms and is stable at the time of discharge.    The patient is referred to a primary physician for blood pressure management, diabetic screening, and for all other preventative health concerns.      DISPOSITION:  Patient will be discharged home in stable condition.    FOLLOW UP:  [facility], Emergency Dept  [address] [id number] [phone]    If symptoms worsen    [address] [address]  [phone]  Schedule an appointment as soon as possible for a visit   call today.      OUTPATIENT MEDICATIONS:  New Prescriptions    No  medications on file         FINAL IMPRESSION  1. Orthostatic dizziness    2. Orthostasis    3. Near syncope          I, Brittany Mcintyre (Scribe), am scribing for, and in the presence of, Abiodun Echevarria M.D..    Electronically signed by: Brittany Mcintyre (Scribe), 1/9/2019    IAbiodun M.D. personally performed the services described in this documentation, as scribed by Brittany Mcintyre in my presence, and it is both accurate and complete. C.    The note accurately reflects work and decisions made by me.  Abiodun Echevarria  1/9/2019  12:18 PM

## 2019-01-09 NOTE — ED NOTES
Pt ambulates to triage with c/c syncope after showering this AM.  Currently no complaints.  A&ox4.  Pt to EKG

## 2019-04-01 ENCOUNTER — HOSPITAL ENCOUNTER (OUTPATIENT)
Dept: LAB | Facility: MEDICAL CENTER | Age: 24
End: 2019-04-01
Attending: INTERNAL MEDICINE
Payer: MEDICAID

## 2019-04-01 LAB
ALBUMIN SERPL BCP-MCNC: 4.2 G/DL (ref 3.2–4.9)
ALBUMIN/GLOB SERPL: 1.6 G/DL
ALP SERPL-CCNC: 56 U/L (ref 30–99)
ALT SERPL-CCNC: 17 U/L (ref 2–50)
AMBIGUOUS DTTM AMBI4: NORMAL
ANION GAP SERPL CALC-SCNC: 6 MMOL/L (ref 0–11.9)
AST SERPL-CCNC: 16 U/L (ref 12–45)
B-HCG SERPL-ACNC: <0.6 MIU/ML (ref 0–5)
BASOPHILS # BLD AUTO: 1 % (ref 0–1.8)
BASOPHILS # BLD: 0.07 K/UL (ref 0–0.12)
BILIRUB SERPL-MCNC: 0.4 MG/DL (ref 0.1–1.5)
BUN SERPL-MCNC: 10 MG/DL (ref 8–22)
CALCIUM SERPL-MCNC: 8.9 MG/DL (ref 8.5–10.5)
CHLORIDE SERPL-SCNC: 108 MMOL/L (ref 96–112)
CHOLEST SERPL-MCNC: 178 MG/DL (ref 100–199)
CO2 SERPL-SCNC: 25 MMOL/L (ref 20–33)
CREAT SERPL-MCNC: 0.56 MG/DL (ref 0.5–1.4)
EOSINOPHIL # BLD AUTO: 0.12 K/UL (ref 0–0.51)
EOSINOPHIL NFR BLD: 1.7 % (ref 0–6.9)
ERYTHROCYTE [DISTWIDTH] IN BLOOD BY AUTOMATED COUNT: 54.7 FL (ref 35.9–50)
GLOBULIN SER CALC-MCNC: 2.6 G/DL (ref 1.9–3.5)
GLUCOSE SERPL-MCNC: 91 MG/DL (ref 65–99)
HCT VFR BLD AUTO: 39.4 % (ref 37–47)
HGB BLD-MCNC: 11.9 G/DL (ref 12–16)
IMM GRANULOCYTES # BLD AUTO: 0.03 K/UL (ref 0–0.11)
IMM GRANULOCYTES NFR BLD AUTO: 0.4 % (ref 0–0.9)
IRON SERPL-MCNC: 51 UG/DL (ref 40–170)
LYMPHOCYTES # BLD AUTO: 2.24 K/UL (ref 1–4.8)
LYMPHOCYTES NFR BLD: 32.5 % (ref 22–41)
MCH RBC QN AUTO: 25.9 PG (ref 27–33)
MCHC RBC AUTO-ENTMCNC: 30.2 G/DL (ref 33.6–35)
MCV RBC AUTO: 85.7 FL (ref 81.4–97.8)
MONOCYTES # BLD AUTO: 0.41 K/UL (ref 0–0.85)
MONOCYTES NFR BLD AUTO: 6 % (ref 0–13.4)
NEUTROPHILS # BLD AUTO: 4.02 K/UL (ref 2–7.15)
NEUTROPHILS NFR BLD: 58.4 % (ref 44–72)
NRBC # BLD AUTO: 0 K/UL
NRBC BLD-RTO: 0 /100 WBC
PLATELET # BLD AUTO: 264 K/UL (ref 164–446)
PMV BLD AUTO: 11.5 FL (ref 9–12.9)
POTASSIUM SERPL-SCNC: 3.8 MMOL/L (ref 3.6–5.5)
PROT SERPL-MCNC: 6.8 G/DL (ref 6–8.2)
RBC # BLD AUTO: 4.6 M/UL (ref 4.2–5.4)
SODIUM SERPL-SCNC: 139 MMOL/L (ref 135–145)
TSH SERPL DL<=0.005 MIU/L-ACNC: 1.67 UIU/ML (ref 0.38–5.33)
WBC # BLD AUTO: 6.9 K/UL (ref 4.8–10.8)

## 2019-04-01 PROCEDURE — 83036 HEMOGLOBIN GLYCOSYLATED A1C: CPT

## 2019-04-01 PROCEDURE — 80053 COMPREHEN METABOLIC PANEL: CPT

## 2019-04-01 PROCEDURE — 83540 ASSAY OF IRON: CPT

## 2019-04-01 PROCEDURE — 84702 CHORIONIC GONADOTROPIN TEST: CPT

## 2019-04-01 PROCEDURE — 82465 ASSAY BLD/SERUM CHOLESTEROL: CPT

## 2019-04-01 PROCEDURE — 84443 ASSAY THYROID STIM HORMONE: CPT

## 2019-04-01 PROCEDURE — 85025 COMPLETE CBC W/AUTO DIFF WBC: CPT

## 2019-04-02 LAB
EST. AVERAGE GLUCOSE BLD GHB EST-MCNC: 117 MG/DL
HBA1C MFR BLD: 5.7 % (ref 0–5.6)

## 2019-04-16 ENCOUNTER — HOSPITAL ENCOUNTER (EMERGENCY)
Facility: MEDICAL CENTER | Age: 24
End: 2019-04-16
Attending: EMERGENCY MEDICINE
Payer: MEDICAID

## 2019-04-16 ENCOUNTER — APPOINTMENT (OUTPATIENT)
Dept: RADIOLOGY | Facility: MEDICAL CENTER | Age: 24
End: 2019-04-16
Attending: EMERGENCY MEDICINE
Payer: MEDICAID

## 2019-04-16 VITALS
HEIGHT: 64 IN | TEMPERATURE: 98.2 F | DIASTOLIC BLOOD PRESSURE: 88 MMHG | HEART RATE: 90 BPM | BODY MASS INDEX: 28.83 KG/M2 | SYSTOLIC BLOOD PRESSURE: 124 MMHG | RESPIRATION RATE: 16 BRPM | WEIGHT: 168.87 LBS | OXYGEN SATURATION: 100 %

## 2019-04-16 DIAGNOSIS — S63.635A SPRAIN OF INTERPHALANGEAL JOINT OF LEFT RING FINGER, INITIAL ENCOUNTER: ICD-10-CM

## 2019-04-16 PROCEDURE — 99283 EMERGENCY DEPT VISIT LOW MDM: CPT

## 2019-04-16 PROCEDURE — 73140 X-RAY EXAM OF FINGER(S): CPT | Mod: LT

## 2019-04-17 NOTE — ED PROVIDER NOTES
"ED Provider Note    CHIEF COMPLAINT  Chief Complaint   Patient presents with   • Digit Pain     left ring finger       HPI  Tricia Rees is a 23 y.o. female who presents with pain to the left fourth phalanx.  The patient states that she inadvertently fell while getting in bed and caused a hyperextension injury to the left fourth phalanx.  She states that she heard a pop and since that time she has had pain at the IP joint.  She has limited flexion due to severe pain.  She does not have any pain in the left hand or left wrist.    REVIEW OF SYSTEMS  No other muscular skeletal complaints    PHYSICAL EXAM  VITAL SIGNS: /88   Pulse 90   Temp 36.8 °C (98.2 °F) (Temporal)   Resp 16   Ht 1.626 m (5' 4\")   Wt 76.6 kg (168 lb 14 oz)   SpO2 100%   BMI 28.99 kg/m²   In general the patient does not appear toxic  Extremities pain and swelling to the left fourth PIP joint.  The patient does not have any obvious deformities.  She does have full flexion extension at the MCP joints.  She has a normal left wrist exam.  Skin no erythema nor induration  Neurovascular examination is intact to the left hand    RADIOLOGY/PROCEDURES  DX-FINGER(S) 2+ LEFT   Final Result         1.  No acute traumatic bony injury identified.            COURSE & MEDICAL DECISION MAKING  Pertinent Labs & Imaging studies reviewed. (See chart for details)  This a 23-year-old female who presents the emerge department with left fourth phalanx discomfort after hyperextension injury.  I suspect she does have a significant sprain of the left fourth phalanx.  The x-ray does not show any evidence of a fracture nor dislocation.  The patient will be placed in aluminum splint for immobilization and she will utilize ice and Motrin.  If she is not asymptomatic in 7-10 days she will follow-up with Dr. Reed from [facility] orthopedics.    FINAL IMPRESSION  1.  Left fourth phalanx sprain       Disposition  The patient will be discharged in stable " condition      Electronically signed by: Perry Kaur, 4/16/2019 8:24 PM

## 2019-04-17 NOTE — ED NOTES
Splint applied. Given dc inst. No rx. vebralizes understanding. Ambulatory to lobby c steady gait.

## 2019-04-17 NOTE — ED TRIAGE NOTES
"Chief Complaint   Patient presents with   • Digit Pain     left ring finger     Pt fell to floor landing with her knee on her hand.  Pt states left ring finger was hyper extended and she heard a \"crunching\" noise.  + cms.  No obvious deformity.  Triage process explained to patient.  Pt back to waiting room.  Pt instructed to inform RN if any changes or questions arise.    "

## 2019-05-09 ENCOUNTER — HOSPITAL ENCOUNTER (EMERGENCY)
Dept: HOSPITAL 8 - ED | Age: 24
Discharge: HOME | End: 2019-05-09
Payer: MEDICAID

## 2019-05-09 VITALS — SYSTOLIC BLOOD PRESSURE: 120 MMHG | DIASTOLIC BLOOD PRESSURE: 74 MMHG

## 2019-05-09 VITALS — WEIGHT: 170.42 LBS | HEIGHT: 64 IN | BODY MASS INDEX: 29.09 KG/M2

## 2019-05-09 DIAGNOSIS — O26.891: Primary | ICD-10-CM

## 2019-05-09 DIAGNOSIS — Z3A.01: ICD-10-CM

## 2019-05-09 LAB
ALBUMIN SERPL-MCNC: 4 G/DL (ref 3.4–5)
ANION GAP SERPL CALC-SCNC: 8 MMOL/L (ref 5–15)
BASOPHILS # BLD AUTO: 0.03 X10^3/UL (ref 0–0.1)
BASOPHILS NFR BLD AUTO: 0 % (ref 0–1)
CALCIUM SERPL-MCNC: 8.9 MG/DL (ref 8.5–10.1)
CHLORIDE SERPL-SCNC: 109 MMOL/L (ref 98–107)
CREAT SERPL-MCNC: 0.63 MG/DL (ref 0.55–1.02)
CULTURE INDICATED?: NO
EOSINOPHIL # BLD AUTO: 0.14 X10^3/UL (ref 0–0.4)
EOSINOPHIL NFR BLD AUTO: 1 % (ref 1–7)
ERYTHROCYTE [DISTWIDTH] IN BLOOD BY AUTOMATED COUNT: 17.5 % (ref 9.6–15.2)
LYMPHOCYTES # BLD AUTO: 2.96 X10^3/UL (ref 1–3.4)
LYMPHOCYTES NFR BLD AUTO: 27 % (ref 22–44)
MCH RBC QN AUTO: 26.8 PG (ref 27–34.8)
MCHC RBC AUTO-ENTMCNC: 33.4 G/DL (ref 32.4–35.8)
MCV RBC AUTO: 80.3 FL (ref 80–100)
MD: NO
MICROSCOPIC: (no result)
MONOCYTES # BLD AUTO: 0.43 X10^3/UL (ref 0.2–0.8)
MONOCYTES NFR BLD AUTO: 4 % (ref 2–9)
NEUTROPHILS # BLD AUTO: 7.54 X10^3/UL (ref 1.8–6.8)
NEUTROPHILS NFR BLD AUTO: 68 % (ref 42–75)
PLATELET # BLD AUTO: 350 X10^3/UL (ref 130–400)
PMV BLD AUTO: 8.5 FL (ref 7.4–10.4)
RBC # BLD AUTO: 4.77 X10^6/UL (ref 3.82–5.3)

## 2019-05-09 PROCEDURE — 99284 EMERGENCY DEPT VISIT MOD MDM: CPT

## 2019-05-09 PROCEDURE — 36415 COLL VENOUS BLD VENIPUNCTURE: CPT

## 2019-05-09 PROCEDURE — 76801 OB US < 14 WKS SINGLE FETUS: CPT

## 2019-05-09 PROCEDURE — 82040 ASSAY OF SERUM ALBUMIN: CPT

## 2019-05-09 PROCEDURE — 84702 CHORIONIC GONADOTROPIN TEST: CPT

## 2019-05-09 PROCEDURE — 85025 COMPLETE CBC W/AUTO DIFF WBC: CPT

## 2019-05-09 PROCEDURE — 80048 BASIC METABOLIC PNL TOTAL CA: CPT

## 2019-05-09 PROCEDURE — 86901 BLOOD TYPING SEROLOGIC RH(D): CPT

## 2019-05-09 PROCEDURE — 81003 URINALYSIS AUTO W/O SCOPE: CPT

## 2019-05-11 ENCOUNTER — HOSPITAL ENCOUNTER (EMERGENCY)
Dept: HOSPITAL 8 - ED | Age: 24
Discharge: HOME | End: 2019-05-11
Payer: MEDICAID

## 2019-05-11 VITALS — DIASTOLIC BLOOD PRESSURE: 67 MMHG | SYSTOLIC BLOOD PRESSURE: 118 MMHG

## 2019-05-11 VITALS — WEIGHT: 169.76 LBS | HEIGHT: 64 IN | BODY MASS INDEX: 28.98 KG/M2

## 2019-05-11 DIAGNOSIS — O20.0: Primary | ICD-10-CM

## 2019-05-11 PROCEDURE — 76801 OB US < 14 WKS SINGLE FETUS: CPT

## 2019-05-11 PROCEDURE — 84702 CHORIONIC GONADOTROPIN TEST: CPT

## 2019-05-11 PROCEDURE — 99284 EMERGENCY DEPT VISIT MOD MDM: CPT

## 2019-05-11 PROCEDURE — 36415 COLL VENOUS BLD VENIPUNCTURE: CPT

## 2019-05-18 ENCOUNTER — HOSPITAL ENCOUNTER (EMERGENCY)
Dept: HOSPITAL 8 - ED | Age: 24
Discharge: HOME | End: 2019-05-18
Payer: MEDICAID

## 2019-05-18 VITALS — SYSTOLIC BLOOD PRESSURE: 118 MMHG | DIASTOLIC BLOOD PRESSURE: 71 MMHG

## 2019-05-18 VITALS — HEIGHT: 64 IN | WEIGHT: 171.3 LBS | BODY MASS INDEX: 29.24 KG/M2

## 2019-05-18 DIAGNOSIS — Z3A.01: ICD-10-CM

## 2019-05-18 DIAGNOSIS — O21.0: Primary | ICD-10-CM

## 2019-05-18 LAB
ALBUMIN SERPL-MCNC: 3.5 G/DL (ref 3.4–5)
ALP SERPL-CCNC: 56 U/L (ref 45–117)
ALT SERPL-CCNC: 21 U/L (ref 12–78)
ANION GAP SERPL CALC-SCNC: 6 MMOL/L (ref 5–15)
BASOPHILS # BLD AUTO: 0.1 X10^3/UL (ref 0–0.1)
BASOPHILS NFR BLD AUTO: 1 % (ref 0–1)
BILIRUB SERPL-MCNC: 0.1 MG/DL (ref 0.2–1)
CALCIUM SERPL-MCNC: 8.9 MG/DL (ref 8.5–10.1)
CHLORIDE SERPL-SCNC: 111 MMOL/L (ref 98–107)
CREAT SERPL-MCNC: 0.57 MG/DL (ref 0.55–1.02)
CULTURE INDICATED?: YES
EOSINOPHIL # BLD AUTO: 0.17 X10^3/UL (ref 0–0.4)
EOSINOPHIL NFR BLD AUTO: 1 % (ref 1–7)
ERYTHROCYTE [DISTWIDTH] IN BLOOD BY AUTOMATED COUNT: 17.1 % (ref 9.6–15.2)
LYMPHOCYTES # BLD AUTO: 3.19 X10^3/UL (ref 1–3.4)
LYMPHOCYTES NFR BLD AUTO: 24 % (ref 22–44)
MCH RBC QN AUTO: 26.3 PG (ref 27–34.8)
MCHC RBC AUTO-ENTMCNC: 32.1 G/DL (ref 32.4–35.8)
MCV RBC AUTO: 81.9 FL (ref 80–100)
MD: NO
MICROSCOPIC: (no result)
MONOCYTES # BLD AUTO: 0.88 X10^3/UL (ref 0.2–0.8)
MONOCYTES NFR BLD AUTO: 7 % (ref 2–9)
NEUTROPHILS # BLD AUTO: 8.95 X10^3/UL (ref 1.8–6.8)
NEUTROPHILS NFR BLD AUTO: 67 % (ref 42–75)
PLATELET # BLD AUTO: 287 X10^3/UL (ref 130–400)
PMV BLD AUTO: 8.8 FL (ref 7.4–10.4)
PROT SERPL-MCNC: 7.1 G/DL (ref 6.4–8.2)
RBC # BLD AUTO: 4.57 X10^6/UL (ref 3.82–5.3)

## 2019-05-18 PROCEDURE — 81001 URINALYSIS AUTO W/SCOPE: CPT

## 2019-05-18 PROCEDURE — 80053 COMPREHEN METABOLIC PANEL: CPT

## 2019-05-18 PROCEDURE — 93005 ELECTROCARDIOGRAM TRACING: CPT

## 2019-05-18 PROCEDURE — 99284 EMERGENCY DEPT VISIT MOD MDM: CPT

## 2019-05-18 PROCEDURE — 85025 COMPLETE CBC W/AUTO DIFF WBC: CPT

## 2019-05-18 PROCEDURE — 36415 COLL VENOUS BLD VENIPUNCTURE: CPT

## 2019-05-18 PROCEDURE — 84702 CHORIONIC GONADOTROPIN TEST: CPT

## 2019-05-18 PROCEDURE — 87086 URINE CULTURE/COLONY COUNT: CPT

## 2019-06-11 ENCOUNTER — APPOINTMENT (OUTPATIENT)
Dept: RADIOLOGY | Facility: MEDICAL CENTER | Age: 24
End: 2019-06-11
Attending: EMERGENCY MEDICINE
Payer: MEDICAID

## 2019-06-11 ENCOUNTER — HOSPITAL ENCOUNTER (EMERGENCY)
Facility: MEDICAL CENTER | Age: 24
End: 2019-06-11
Attending: EMERGENCY MEDICINE
Payer: MEDICAID

## 2019-06-11 VITALS
BODY MASS INDEX: 29.92 KG/M2 | SYSTOLIC BLOOD PRESSURE: 118 MMHG | HEIGHT: 64 IN | WEIGHT: 175.27 LBS | OXYGEN SATURATION: 100 % | DIASTOLIC BLOOD PRESSURE: 68 MMHG | TEMPERATURE: 98.3 F | HEART RATE: 70 BPM | RESPIRATION RATE: 18 BRPM

## 2019-06-11 DIAGNOSIS — O26.891 ABDOMINAL PAIN DURING PREGNANCY IN FIRST TRIMESTER: ICD-10-CM

## 2019-06-11 DIAGNOSIS — R10.9 ABDOMINAL PAIN DURING PREGNANCY IN FIRST TRIMESTER: ICD-10-CM

## 2019-06-11 LAB
ALBUMIN SERPL BCP-MCNC: 4.1 G/DL (ref 3.2–4.9)
ALBUMIN/GLOB SERPL: 1.2 G/DL
ALP SERPL-CCNC: 53 U/L (ref 30–99)
ALT SERPL-CCNC: 11 U/L (ref 2–50)
ANION GAP SERPL CALC-SCNC: 7 MMOL/L (ref 0–11.9)
APPEARANCE UR: CLEAR
AST SERPL-CCNC: 16 U/L (ref 12–45)
B-HCG SERPL-ACNC: ABNORMAL MIU/ML (ref 0–5)
BASOPHILS # BLD AUTO: 0.6 % (ref 0–1.8)
BASOPHILS # BLD: 0.08 K/UL (ref 0–0.12)
BILIRUB SERPL-MCNC: 0.2 MG/DL (ref 0.1–1.5)
BILIRUB UR QL STRIP.AUTO: NEGATIVE
BUN SERPL-MCNC: 10 MG/DL (ref 8–22)
CALCIUM SERPL-MCNC: 9.3 MG/DL (ref 8.5–10.5)
CHLORIDE SERPL-SCNC: 105 MMOL/L (ref 96–112)
CO2 SERPL-SCNC: 24 MMOL/L (ref 20–33)
COLOR UR: YELLOW
CREAT SERPL-MCNC: 0.46 MG/DL (ref 0.5–1.4)
EOSINOPHIL # BLD AUTO: 0.18 K/UL (ref 0–0.51)
EOSINOPHIL NFR BLD: 1.2 % (ref 0–6.9)
ERYTHROCYTE [DISTWIDTH] IN BLOOD BY AUTOMATED COUNT: 49.7 FL (ref 35.9–50)
GLOBULIN SER CALC-MCNC: 3.5 G/DL (ref 1.9–3.5)
GLUCOSE SERPL-MCNC: 78 MG/DL (ref 65–99)
GLUCOSE UR STRIP.AUTO-MCNC: NEGATIVE MG/DL
HBV SURFACE AG SERPL QL IA: NEGATIVE
HCT VFR BLD AUTO: 39.9 % (ref 37–47)
HGB BLD-MCNC: 12.8 G/DL (ref 12–16)
HIV 1+2 AB+HIV1 P24 AG SERPL QL IA: NORMAL
IMM GRANULOCYTES # BLD AUTO: 0.07 K/UL (ref 0–0.11)
IMM GRANULOCYTES NFR BLD AUTO: 0.5 % (ref 0–0.9)
KETONES UR STRIP.AUTO-MCNC: NEGATIVE MG/DL
LEUKOCYTE ESTERASE UR QL STRIP.AUTO: NEGATIVE
LYMPHOCYTES # BLD AUTO: 3.42 K/UL (ref 1–4.8)
LYMPHOCYTES NFR BLD: 23.6 % (ref 22–41)
MCH RBC QN AUTO: 26.3 PG (ref 27–33)
MCHC RBC AUTO-ENTMCNC: 32.1 G/DL (ref 33.6–35)
MCV RBC AUTO: 81.9 FL (ref 81.4–97.8)
MICRO URNS: NORMAL
MONOCYTES # BLD AUTO: 0.93 K/UL (ref 0–0.85)
MONOCYTES NFR BLD AUTO: 6.4 % (ref 0–13.4)
NEUTROPHILS # BLD AUTO: 9.84 K/UL (ref 2–7.15)
NEUTROPHILS NFR BLD: 67.7 % (ref 44–72)
NITRITE UR QL STRIP.AUTO: NEGATIVE
NRBC # BLD AUTO: 0 K/UL
NRBC BLD-RTO: 0 /100 WBC
NUMBER OF RH DOSES IND 8505RD: NORMAL
PH UR STRIP.AUTO: 6.5 [PH]
PLATELET # BLD AUTO: 301 K/UL (ref 164–446)
PMV BLD AUTO: 10.6 FL (ref 9–12.9)
POTASSIUM SERPL-SCNC: 3.5 MMOL/L (ref 3.6–5.5)
PROT SERPL-MCNC: 7.6 G/DL (ref 6–8.2)
PROT UR QL STRIP: NEGATIVE MG/DL
RBC # BLD AUTO: 4.87 M/UL (ref 4.2–5.4)
RBC UR QL AUTO: NEGATIVE
RH BLD: NORMAL
RUBV IGG SERPL IA-ACNC: 9.98
SODIUM SERPL-SCNC: 136 MMOL/L (ref 135–145)
SP GR UR STRIP.AUTO: 1.01
TREPONEMA PALLIDUM IGG+IGM AB [PRESENCE] IN SERUM OR PLASMA BY IMMUNOASSAY: NORMAL
UROBILINOGEN UR STRIP.AUTO-MCNC: 0.2 MG/DL
WBC # BLD AUTO: 14.5 K/UL (ref 4.8–10.8)

## 2019-06-11 PROCEDURE — 99284 EMERGENCY DEPT VISIT MOD MDM: CPT

## 2019-06-11 PROCEDURE — 86901 BLOOD TYPING SEROLOGIC RH(D): CPT

## 2019-06-11 PROCEDURE — 76815 OB US LIMITED FETUS(S): CPT

## 2019-06-11 PROCEDURE — 84702 CHORIONIC GONADOTROPIN TEST: CPT

## 2019-06-11 PROCEDURE — 76801 OB US < 14 WKS SINGLE FETUS: CPT

## 2019-06-11 PROCEDURE — 80053 COMPREHEN METABOLIC PANEL: CPT

## 2019-06-11 PROCEDURE — 81003 URINALYSIS AUTO W/O SCOPE: CPT

## 2019-06-11 PROCEDURE — 85025 COMPLETE CBC W/AUTO DIFF WBC: CPT

## 2019-06-12 NOTE — ED TRIAGE NOTES
".  Chief Complaint   Patient presents with   • Pelvic Pain     ./70   Pulse 78   Temp 36.8 °C (98.3 °F) (Temporal)   Resp 16   Ht 1.626 m (5' 4\")   Wt 79.5 kg (175 lb 4.3 oz)   LMP 2019   SpO2 100%   BMI 30.08 kg/m²     Ambulatory to triage for \"pelvic\" pressure, 10 weeks pregnant, A2, patient states she has been \"spotting\" throughout pregnancy, denies spotting today, educated on triage process, placed in lobby, told to inform staff of any changes in condition.    "

## 2019-06-12 NOTE — ED NOTES
Pt resting in bed with eyes closed, breathing even and unlabored, easily arousable to voice, no s/s of distress noted

## 2019-06-12 NOTE — ED NOTES
Pt resting in bed with eyes closed, breathing even and unlabored, no s/s of distress noted, family at bedside

## 2019-06-12 NOTE — ED PROVIDER NOTES
ED Provider Note    CHIEF COMPLAINT  Chief Complaint   Patient presents with   • Pelvic Pain       HPI  Tricia Rees is a 23 y.o. female who presents to emergency room today with complaints of pelvic pain with pregnancy, spotting.  Patient has history  5 para 2, patient has history of incompetent cervix and currently being followed by Dr. Hutchison, pain became worse today she is concerned that she may be miscarrying as she has had problems in the past with miscarriage.  Some nausea but no vomiting no changes to bowel or bladder she does have pain described as cramping to her lower abdomen rating to both sides.    REVIEW OF SYSTEMS  See HPI for further details. All other systems are negative.      PAST MEDICAL HISTORY  Past Medical History:   Diagnosis Date   • HBsAg (hepatitis B surface antigen) positive, currently pregnant (HCC) 2012   •  treated with cerclage at 14 weeks gestation 2012   • Powell cerclage present 2012   • Incompetent cervix in pregnancy 2012       FAMILY HISTORY  Family History   Problem Relation Age of Onset   • Diabetes Father         NIDDM       SOCIAL HISTORY  Social History     Social History   • Marital status: Single     Spouse name: N/A   • Number of children: N/A   • Years of education: N/A     Social History Main Topics   • Smoking status: Never Smoker   • Smokeless tobacco: Never Used   • Alcohol use Yes      Comment: occassionally   • Drug use: Yes     Types: Inhaled      Comment: THC for menstral pains   • Sexual activity: Not Currently     Partners: Male      Comment: unplanned pregnancy FOB involved     Other Topics Concern   • Not on file     Social History Narrative   • No narrative on file       SURGICAL HISTORY  Past Surgical History:   Procedure Laterality Date   • PRIMARY C SECTION  3/25/2012    Performed by CHATO CONDE at LABOR AND DELIVERY   • CERVICAL CERCLAGE  3/24/2012    Performed by PRAKASH RICO at LABOR AND DELIVERY   •  "CERVICAL CERCLAGE    10/2011       CURRENT MEDICATIONS  Home Medications     Reviewed by Edel Fields R.N. (Registered Nurse) on 06/11/19 at 1806  Med List Status: Complete   Medication Last Dose Status   acetaminophen (TYLENOL) 325 MG Tab  Active   ketorolac (TORADOL) 10 MG Tab  Active   ondansetron (ZOFRAN) 4 MG Tab tablet 1 week ago Active   Prenatal MV-Min-Fe Fum-FA-DHA (PRENATAL 1 PO) 6/11/2019 Active                ALLERGIES  Allergies   Allergen Reactions   • Nkda [No Known Drug Allergy]        PHYSICAL EXAM  VITAL SIGNS: /68   Pulse 70   Temp 36.8 °C (98.3 °F) (Temporal)   Resp 18   Ht 1.626 m (5' 4\")   Wt 79.5 kg (175 lb 4.3 oz)   LMP 04/02/2019   SpO2 100%   BMI 30.08 kg/m²       Constitutional: Well developed, Well nourished,  acute distress, Non-toxic appearance.   HENT: Normocephalic, Atraumatic, Bilateral external ears normal, Oropharynx moist, No oral exudates, Nose normal.   Eyes: PERRLA, EOMI, Conjunctiva normal, No discharge.   Neck: Normal range of motion, No tenderness, Supple, No stridor.   Lymphatic: No lymphadenopathy noted.   Cardiovascular: Normal heart rate, Normal rhythm, No murmurs, No rubs, No gallops.   Thorax & Lungs: Normal breath sounds, No respiratory distress, No wheezing, No chest tenderness.   Abdomen: Bowel sounds normal, Soft, minimal lower quadrant tenderness, No masses, No pulsatile masses.  No rebound, guarding or peritoneal signs noted.  Genitalia: Cervical os is closed there is no bleeding or tissue noted or abscess or mass or lesion.  Skin: Warm, Dry, No erythema, No rash.   Back: No tenderness, No CVA tenderness.     RADIOLOGY/PROCEDURES  US-OB LIMITED TRANSABDOMINAL   Final Result         1.  Live intrauterine pregnancy of an estimated gestational age of 10 weeks 0 days with an estimated date of delivery of January 7, 2020.            COURSE & MEDICAL DECISION MAKING  Pertinent Labs & Imaging studies reviewed. (See chart for details)  Discussed case " with Dr. Fields who is on-call for Dr. Wade, patient placed on pelvic rest no for work was provided may use Tylenol for pain.  Return if increased pain helped by medication, please fever or further worsening symptoms or next 12 to 24 hours otherwise will follow up with her OB/GYN.  Ultrasound shows live IUP at 10 weeks.  Patient and her  verbalized understanding above instructions need for follow-up.  Patient is Rh+    FINAL IMPRESSION  1.  Acute abdominal pain in first trimester pregnancy  2.  Vaginal bleeding in pregnancy  3.      Electronically signed by: Trevor Hurtado, 6/12/2019 1:32 AM

## 2019-06-12 NOTE — ED NOTES
Pt educated on follow up care with primary care physician and medication adherence, pt verbalized understanding of all instructions and had no questions, pt ambulated with steady gait to waiting room with sober ride

## 2019-07-12 ENCOUNTER — ANESTHESIA EVENT (OUTPATIENT)
Dept: OBGYN | Facility: MEDICAL CENTER | Age: 24
End: 2019-07-12
Payer: MEDICAID

## 2019-07-12 ENCOUNTER — HOSPITAL ENCOUNTER (OUTPATIENT)
Facility: MEDICAL CENTER | Age: 24
End: 2019-07-12
Attending: OBSTETRICS & GYNECOLOGY | Admitting: OBSTETRICS & GYNECOLOGY
Payer: MEDICAID

## 2019-07-12 ENCOUNTER — ANESTHESIA (OUTPATIENT)
Dept: OBGYN | Facility: MEDICAL CENTER | Age: 24
End: 2019-07-12
Payer: MEDICAID

## 2019-07-12 VITALS
DIASTOLIC BLOOD PRESSURE: 58 MMHG | BODY MASS INDEX: 29.66 KG/M2 | SYSTOLIC BLOOD PRESSURE: 109 MMHG | TEMPERATURE: 97 F | HEIGHT: 65 IN | WEIGHT: 178 LBS | RESPIRATION RATE: 16 BRPM | HEART RATE: 80 BPM | OXYGEN SATURATION: 97 %

## 2019-07-12 LAB
BASOPHILS # BLD AUTO: 0.5 % (ref 0–1.8)
BASOPHILS # BLD: 0.06 K/UL (ref 0–0.12)
EOSINOPHIL # BLD AUTO: 0.09 K/UL (ref 0–0.51)
EOSINOPHIL NFR BLD: 0.7 % (ref 0–6.9)
ERYTHROCYTE [DISTWIDTH] IN BLOOD BY AUTOMATED COUNT: 51.7 FL (ref 35.9–50)
HCT VFR BLD AUTO: 36.6 % (ref 37–47)
HGB BLD-MCNC: 12 G/DL (ref 12–16)
IMM GRANULOCYTES # BLD AUTO: 0.07 K/UL (ref 0–0.11)
IMM GRANULOCYTES NFR BLD AUTO: 0.5 % (ref 0–0.9)
LYMPHOCYTES # BLD AUTO: 2.96 K/UL (ref 1–4.8)
LYMPHOCYTES NFR BLD: 22.3 % (ref 22–41)
MCH RBC QN AUTO: 27.1 PG (ref 27–33)
MCHC RBC AUTO-ENTMCNC: 32.8 G/DL (ref 33.6–35)
MCV RBC AUTO: 82.6 FL (ref 81.4–97.8)
MONOCYTES # BLD AUTO: 0.62 K/UL (ref 0–0.85)
MONOCYTES NFR BLD AUTO: 4.7 % (ref 0–13.4)
NEUTROPHILS # BLD AUTO: 9.45 K/UL (ref 2–7.15)
NEUTROPHILS NFR BLD: 71.3 % (ref 44–72)
NRBC # BLD AUTO: 0 K/UL
NRBC BLD-RTO: 0 /100 WBC
PLATELET # BLD AUTO: 240 K/UL (ref 164–446)
PMV BLD AUTO: 11.1 FL (ref 9–12.9)
RBC # BLD AUTO: 4.43 M/UL (ref 4.2–5.4)
WBC # BLD AUTO: 13.3 K/UL (ref 4.8–10.8)

## 2019-07-12 PROCEDURE — 700111 HCHG RX REV CODE 636 W/ 250 OVERRIDE (IP): Performed by: ANESTHESIOLOGY

## 2019-07-12 PROCEDURE — 96375 TX/PRO/DX INJ NEW DRUG ADDON: CPT

## 2019-07-12 PROCEDURE — 304964 HCHG RECOVERY ROOM TIME 1HR: Performed by: OBSTETRICS & GYNECOLOGY

## 2019-07-12 PROCEDURE — 305385 HCHG SURGICAL SERVICES 1/4 HOUR: Performed by: OBSTETRICS & GYNECOLOGY

## 2019-07-12 PROCEDURE — 59320 REVISION OF CERVIX: CPT

## 2019-07-12 PROCEDURE — 96365 THER/PROPH/DIAG IV INF INIT: CPT

## 2019-07-12 PROCEDURE — 700105 HCHG RX REV CODE 258: Performed by: ANESTHESIOLOGY

## 2019-07-12 PROCEDURE — 700111 HCHG RX REV CODE 636 W/ 250 OVERRIDE (IP): Performed by: OBSTETRICS & GYNECOLOGY

## 2019-07-12 PROCEDURE — 306828 HCHG ANES-TIME GENERAL: Performed by: OBSTETRICS & GYNECOLOGY

## 2019-07-12 PROCEDURE — 700102 HCHG RX REV CODE 250 W/ 637 OVERRIDE(OP): Performed by: ANESTHESIOLOGY

## 2019-07-12 PROCEDURE — 85025 COMPLETE CBC W/AUTO DIFF WBC: CPT

## 2019-07-12 PROCEDURE — 700101 HCHG RX REV CODE 250: Performed by: ANESTHESIOLOGY

## 2019-07-12 RX ORDER — HYDROMORPHONE HYDROCHLORIDE 1 MG/ML
0.2 INJECTION, SOLUTION INTRAMUSCULAR; INTRAVENOUS; SUBCUTANEOUS
Status: CANCELLED | OUTPATIENT
Start: 2019-07-12

## 2019-07-12 RX ORDER — MEPERIDINE HYDROCHLORIDE 25 MG/ML
12.5 INJECTION INTRAMUSCULAR; INTRAVENOUS; SUBCUTANEOUS
Status: CANCELLED | OUTPATIENT
Start: 2019-07-12

## 2019-07-12 RX ORDER — HYDROMORPHONE HYDROCHLORIDE 1 MG/ML
0.4 INJECTION, SOLUTION INTRAMUSCULAR; INTRAVENOUS; SUBCUTANEOUS
Status: CANCELLED | OUTPATIENT
Start: 2019-07-12

## 2019-07-12 RX ORDER — ONDANSETRON 2 MG/ML
4 INJECTION INTRAMUSCULAR; INTRAVENOUS
Status: CANCELLED | OUTPATIENT
Start: 2019-07-12

## 2019-07-12 RX ORDER — CITRIC ACID/SODIUM CITRATE 334-500MG
30 SOLUTION, ORAL ORAL ONCE
Status: COMPLETED | OUTPATIENT
Start: 2019-07-12 | End: 2019-07-12

## 2019-07-12 RX ORDER — OXYCODONE HYDROCHLORIDE AND ACETAMINOPHEN 5; 325 MG/1; MG/1
1 TABLET ORAL ONCE
Status: DISCONTINUED | OUTPATIENT
Start: 2019-07-12 | End: 2019-07-13 | Stop reason: HOSPADM

## 2019-07-12 RX ORDER — DIPHENHYDRAMINE HYDROCHLORIDE 50 MG/ML
12.5 INJECTION INTRAMUSCULAR; INTRAVENOUS
Status: CANCELLED | OUTPATIENT
Start: 2019-07-12

## 2019-07-12 RX ORDER — SODIUM CHLORIDE, SODIUM GLUCONATE, SODIUM ACETATE, POTASSIUM CHLORIDE AND MAGNESIUM CHLORIDE 526; 502; 368; 37; 30 MG/100ML; MG/100ML; MG/100ML; MG/100ML; MG/100ML
INJECTION, SOLUTION INTRAVENOUS
Status: DISCONTINUED | OUTPATIENT
Start: 2019-07-12 | End: 2019-07-12 | Stop reason: SURG

## 2019-07-12 RX ORDER — LABETALOL HYDROCHLORIDE 5 MG/ML
5 INJECTION, SOLUTION INTRAVENOUS
Status: CANCELLED | OUTPATIENT
Start: 2019-07-12

## 2019-07-12 RX ORDER — SODIUM CHLORIDE, SODIUM LACTATE, POTASSIUM CHLORIDE, CALCIUM CHLORIDE 600; 310; 30; 20 MG/100ML; MG/100ML; MG/100ML; MG/100ML
INJECTION, SOLUTION INTRAVENOUS CONTINUOUS
Status: CANCELLED | OUTPATIENT
Start: 2019-07-12

## 2019-07-12 RX ORDER — SODIUM CHLORIDE, SODIUM GLUCONATE, SODIUM ACETATE, POTASSIUM CHLORIDE AND MAGNESIUM CHLORIDE 526; 502; 368; 37; 30 MG/100ML; MG/100ML; MG/100ML; MG/100ML; MG/100ML
1500 INJECTION, SOLUTION INTRAVENOUS ONCE
Status: COMPLETED | OUTPATIENT
Start: 2019-07-12 | End: 2019-07-12

## 2019-07-12 RX ORDER — OXYCODONE HCL 5 MG/5 ML
5 SOLUTION, ORAL ORAL
Status: CANCELLED | OUTPATIENT
Start: 2019-07-12

## 2019-07-12 RX ORDER — MIDAZOLAM HYDROCHLORIDE 1 MG/ML
1 INJECTION INTRAMUSCULAR; INTRAVENOUS
Status: CANCELLED | OUTPATIENT
Start: 2019-07-12

## 2019-07-12 RX ORDER — HYDROMORPHONE HYDROCHLORIDE 1 MG/ML
0.1 INJECTION, SOLUTION INTRAMUSCULAR; INTRAVENOUS; SUBCUTANEOUS
Status: CANCELLED | OUTPATIENT
Start: 2019-07-12

## 2019-07-12 RX ORDER — METOCLOPRAMIDE HYDROCHLORIDE 5 MG/ML
10 INJECTION INTRAMUSCULAR; INTRAVENOUS ONCE
Status: COMPLETED | OUTPATIENT
Start: 2019-07-12 | End: 2019-07-12

## 2019-07-12 RX ORDER — METOPROLOL TARTRATE 1 MG/ML
1 INJECTION, SOLUTION INTRAVENOUS
Status: CANCELLED | OUTPATIENT
Start: 2019-07-12

## 2019-07-12 RX ORDER — HYDRALAZINE HYDROCHLORIDE 20 MG/ML
5 INJECTION INTRAMUSCULAR; INTRAVENOUS
Status: CANCELLED | OUTPATIENT
Start: 2019-07-12

## 2019-07-12 RX ORDER — BUPIVACAINE HYDROCHLORIDE 7.5 MG/ML
INJECTION, SOLUTION INTRASPINAL PRN
Status: DISCONTINUED | OUTPATIENT
Start: 2019-07-12 | End: 2019-07-12 | Stop reason: SURG

## 2019-07-12 RX ORDER — HALOPERIDOL 5 MG/ML
1 INJECTION INTRAMUSCULAR
Status: CANCELLED | OUTPATIENT
Start: 2019-07-12

## 2019-07-12 RX ORDER — OXYCODONE HCL 5 MG/5 ML
10 SOLUTION, ORAL ORAL
Status: CANCELLED | OUTPATIENT
Start: 2019-07-12

## 2019-07-12 RX ORDER — ACETAMINOPHEN 500 MG
500 TABLET ORAL EVERY 6 HOURS PRN
Status: DISCONTINUED | OUTPATIENT
Start: 2019-07-12 | End: 2019-07-13 | Stop reason: HOSPADM

## 2019-07-12 RX ORDER — CEFAZOLIN SODIUM 2 G/100ML
2 INJECTION, SOLUTION INTRAVENOUS
Status: COMPLETED | OUTPATIENT
Start: 2019-07-12 | End: 2019-07-12

## 2019-07-12 RX ADMIN — CEFAZOLIN SODIUM 2 G: 2 INJECTION, SOLUTION INTRAVENOUS at 18:17

## 2019-07-12 RX ADMIN — SODIUM CHLORIDE, SODIUM GLUCONATE, SODIUM ACETATE, POTASSIUM CHLORIDE AND MAGNESIUM CHLORIDE 1000 ML: 526; 502; 368; 37; 30 INJECTION, SOLUTION INTRAVENOUS at 17:02

## 2019-07-12 RX ADMIN — METOCLOPRAMIDE 10 MG: 5 INJECTION, SOLUTION INTRAMUSCULAR; INTRAVENOUS at 17:47

## 2019-07-12 RX ADMIN — BUPIVACAINE HYDROCHLORIDE IN DEXTROSE 1 ML: 7.5 INJECTION, SOLUTION SUBARACHNOID at 18:56

## 2019-07-12 RX ADMIN — SODIUM CITRATE AND CITRIC ACID MONOHYDRATE 30 ML: 500; 334 SOLUTION ORAL at 17:47

## 2019-07-12 RX ADMIN — FAMOTIDINE 20 MG: 10 INJECTION INTRAVENOUS at 17:47

## 2019-07-12 RX ADMIN — SODIUM CHLORIDE, SODIUM GLUCONATE, SODIUM ACETATE, POTASSIUM CHLORIDE AND MAGNESIUM CHLORIDE: 526; 502; 368; 37; 30 INJECTION, SOLUTION INTRAVENOUS at 18:46

## 2019-07-12 RX ADMIN — FENTANYL CITRATE 15 MCG: 50 INJECTION, SOLUTION INTRAMUSCULAR; INTRAVENOUS at 18:56

## 2019-07-13 PROCEDURE — 59320 REVISION OF CERVIX: CPT

## 2019-07-13 ASSESSMENT — PAIN SCALES - GENERAL: PAIN_LEVEL: 0

## 2019-07-13 NOTE — OR SURGEON
Immediate Post OP Note    PreOp Diagnosis: IUP 14 3/7 weeks                                Cervical insufficiency    PostOp Diagnosis:  IUP 14 3/7 weeks                                   Cervical insufficiency                                    Cervical lacerations, healed      Procedure(s):  CERCLAGE, CERVIX - Wound Class: Contaminated    Surgeon(s):  Kelvin Mcdowell M.D.    Anesthesiologist/Type of Anesthesia:  Anesthesiologist: Ady Gloria M.D./Spinal    Surgical Staff:  Circulator: Edel Hough RYONI  Relief Circulator: Bette Castle RMannyNManny  Scrub Person: Dara Hernandez    Specimens removed if any:  None    Estimated Blood Loss: 10 ml    Findings: Healed cervical lacerations at 9 and 3 o'clock    Complications: None        7/12/2019 7:32 PM Kelvin Mcdowell M.D.

## 2019-07-13 NOTE — ANESTHESIA PROCEDURE NOTES
Spinal Block  Performed by: MARY SMITH  Authorized by: MARY SMITH     Start Time:  7/12/2019 6:53 PM  End Time:  7/12/2019 6:56 PM  Reason for Block: primary anesthetic    patient identified, IV checked, site marked, risks and benefits discussed, surgical consent, monitors and equipment checked, pre-op evaluation and timeout performed    Patient Position:  Sitting  Prep: ChloraPrep, patient draped and sterile technique    Monitoring:  Blood pressure, continuous pulse oximetry and heart rate  Approach:  Midline  Location:  L3-4  Injection Technique:  Single-shot  Skin infiltration:  Lidocaine  Strength:  1%  Dose:  3ml  Needle Type:  Pencan  Needle Gauge:  25 G  CSF flowing pre/post injection:  Yes  Sensory Level:  T8

## 2019-07-13 NOTE — ANESTHESIA PREPROCEDURE EVALUATION
Relevant Problems   No relevant active problems       Physical Exam    Airway   Mallampati: II  TM distance: >3 FB  Neck ROM: full       Cardiovascular - normal exam  Rhythm: regular  Rate: normal  (-) murmur     Dental - normal exam         Pulmonary - normal exam  Breath sounds clear to auscultation     Abdominal    Neurological - normal exam                 Anesthesia Plan    ASA 2       Plan - spinal   Neuraxial block will be primary anesthetic            Postoperative Plan: Postoperative administration of opioids is intended.    Pertinent diagnostic labs and testing reviewed    Informed Consent:    Anesthetic plan and risks discussed with patient.

## 2019-07-13 NOTE — H&P
REASON FOR ADMISSION:  Cerclage.    HISTORY OF PRESENT ILLNESS:  This is a 23-year-old , currently EDC of   2020, currently at 14 weeks and 3 days, being admitted for cerclage.    Patient's past obstetrical history revealed a first pregnancy loss at 18 weeks   and in 2 subsequent pregnancies she had a cerclage achieving near term.  The   patient has a history of indicated cerclage.    PAST MEDICAL HISTORY:  She denies any major medical problems, asthma,   seizures, hypertension, cardiovascular, GI,  diseases.    PREVIOUS OPERATIONS:  Cerclage x2.    PAST OBSTETRICAL HISTORY:  , 18 weeks 4 days, complicated by bleeding and   cramping and subsequent loss of pregnancy.  , she had term 8 pound 2 ounce   infant, had a cerclage placed and was delivered by primary  section.    , she delivered at 35 weeks and 3 days female, 6 pounds 14 ounces, .    Cerclage was removed at 35 weeks.    TRANSFUSIONS:  None.    ALLERGIES:  None.    HABITS:  None.    MEDICATIONS:  None.    VENEREAL DISEASE HISTORY:  History of chlamydia.    PHYSICAL EXAMINATION:  GENERAL:  Well-developed, well-nourished female, alert and oriented x3, in no   acute distress.  HEAD:  Normocephalic.  EYES:  No scleral icterus or subconjunctival pallor.  Pupils equal, react to   light and accommodation.  Extraocular movements symmetrical.  EARS, NOSE, AND THROAT:  Grossly within normal limits.  RESPIRATORY:  Lungs are clear.  HEART:  Regular rate and rhythm, normal S1 and S2.  No S3, S4, or murmurs.  ABDOMEN:  Soft, gravid uterus.  EXTREMITIES:  No edema or varicosities.  Cranial nerves II-XII grossly intact.    ASSESSMENT:  1.  Intrauterine pregnancy, 14 weeks and 3 days.  2.  Previous  section.  3.  History of second trimester loss.  4.  Two prior successful pregnancies with cerclage.    PLAN:  The patient has elected to have a repeat cerclage.  She has been   counseled on the procedure and the risks of the procedure  and complications   are rare.  Success rate is difficult to determine due to variable indicators   for cerclage.    All questions answered to satisfaction.  She wishes to proceed with cerclage.    Time: 35 minutes       ____________________________________     MD PRABHU FLANNERYO / NTS    DD:  07/12/2019 15:54:58  DT:  07/12/2019 18:36:15    D#:  [id number]  Job#:  [id number]    cc: Corinne Capurro MD

## 2019-07-13 NOTE — ANESTHESIA TIME REPORT
Anesthesia Start and Stop Event Times     Date Time Event    7/12/2019 1846 Anesthesia Start     1937 Anesthesia Stop        Responsible Staff  07/12/19    Name Role Begin End    Ady Gloria M.D. Anesth 1846 1937        Preop Diagnosis (Free Text):  Pre-op Diagnosis     CERVICAL INCOMPETENCE, 14 WEEKS        Preop Diagnosis (Codes):  Diagnosis Information     Diagnosis Code(s):         Post op Diagnosis  Cervical incompetence      Premium Reason  A. 3PM - 7AM    Comments:

## 2019-07-13 NOTE — OP REPORT
DATE OF SERVICE:  07/12/2019    PREOPERATIVE DIAGNOSES:   1.  Intrauterine pregnancy 14 weeks and 3 days.  2.  Cervical insufficiency.    POSTOPERATIVE DIAGNOSES:  1.  Intrauterine pregnancy 14 weeks and 3 days.  2.  Cervical insufficiency.  3.  Healed cervical lacerations.    PROCEDURE:  Cervical cerclage.    SURGEON:  Dr. Richard Mcdowell.    ANESTHESIOLOGIST:  Ady Gloria MD    ANESTHESIA:  Spinal.    OPERATIVE FINDINGS:  Healed cervical lacerations at 9 o'clock and 3 o'clock   close to the vaginal fornix.    DESCRIPTION OF PROCEDURE:  After induction of spinal anesthetic, the patient   was placed in the dorsal lithotomy position, prepped and draped in the usual   manner for vaginal procedure.  Appropriate timeouts were called.  She received   Ancef 2 g preoperatively.    Examination revealed there was healed cervical lacerations at 9 and 3 o'clock.    Bladder has been scarred down on the anterior part of the cervix.    A transverse incision was made at the bladder reflection and the bladder was   dissected superiorly approximately 5 mm.  Then, a 5 mm [place] tape was used   and circumferentially placed and taking care to be above and to around the   old cervical lacerations.  The needle was then removed and the suture was   cinched down tightly as possible.  Multiple throws were then placed.  Excess   suture was removed.    Inspection revealed adequate hemostasis and adequate closure of the cervix and   the cervical os was closed.    Vagina was irrigated and hemostasis reassured and there was appropriate color   to the cervix.    Patient tolerated the procedure well.    ESTIMATED BLOOD LOSS:  10 mL.    Returned to recovery in satisfactory condition.  All sponge and needle counts   correct.       ____________________________________     RICHARD MCDOWELL MD    EYO / NTS    DD:  07/12/2019 19:36:26  DT:  07/12/2019 23:08:35    D#:  [id number]  Job#:  [id number]

## 2019-07-13 NOTE — DISCHARGE INSTRUCTIONS
General Instructions:  · If you think you are in labor, time contractions (lying on your left side) from the beginning of one contraction to the beginning of the next contraction for at least one hour.  · Increase fluid intake: you should consume 10-12 8 oz glasses of non-caffeinated fluid per day.  · Report any pressure or burning on urination to your physician.  · Monitor fetal movement: If you notice an absence or decrease in fetal movement, drink a large glass of water and rest on your side.  If there is no increase in movement, call your physician or go to the hospital for further evaluation.  · Report any sudden, sharp abdominal pain.  · Report any bleeding.  Spotting or pinkish discharge is normal after vaginal exam.  You may also spot after sexual intercourse.    Pre-term Labor (<37 weeks):  Call your physician or return to the hospital if:  · You have painless regular contractions more than 4 in one hour.  · Your water breaks (remember time and color).  · You have menstrual-like cramps, a low dull backache or pressure in your pelvis or back.  · Your baby does not move enough to complete the daily kick count (10 movements in 2 hours).  · Your baby moves much less often than on the days before or you have not felt your baby move all day.  · Please review the MEDICATION LIST section of your AFTER VISIT SUMMARY document.  · Take your medication as prescribed      Other Instructions:    Schedule follow-up appointment with Dr. Mcdowell in 1-2 weeks for post-op check. Remain on pelvic rest until follow-up appointment. Notify Dr. Mcdowell if you have a fever, vaginal bleeding or leaking of amniotic fluid.    Please carefully review your entire AFTER VISIT SUMMARY document for all discharge instructions.

## 2019-07-13 NOTE — ANESTHESIA POSTPROCEDURE EVALUATION
Patient: Tricia Rees    Procedure Summary     Date:  07/12/19 Room / Location:  [facility] OR 01 / LABOR AND DELIVERY    Anesthesia Start:  1846 Anesthesia Stop:  1937    Procedure:  CERCLAGE, CERVIX (Vagina ) Diagnosis:  (cerclage placed)    Surgeon:  Kelvin Mcdowell M.D. Responsible Provider:  Ady Gloria M.D.    Anesthesia Type:  spinal ASA Status:  2          Final Anesthesia Type: spinal  Last vitals  BP   Blood Pressure: 109/58    Temp   36.1 °C (97 °F)    Pulse   Pulse: 80   Resp   16    SpO2   97 %      Anesthesia Post Evaluation    Patient location during evaluation: PACU  Patient participation: complete - patient participated  Level of consciousness: awake and alert  Pain score: 0    Airway patency: patent  Anesthetic complications: no  Cardiovascular status: hemodynamically stable  Respiratory status: acceptable  Hydration status: euvolemic    PONV: none           Nurse Pain Score: 0 (NPRS)

## 2019-07-22 ENCOUNTER — HOSPITAL ENCOUNTER (OUTPATIENT)
Facility: MEDICAL CENTER | Age: 24
End: 2019-07-22
Attending: OBSTETRICS & GYNECOLOGY | Admitting: OBSTETRICS & GYNECOLOGY
Payer: MEDICAID

## 2019-07-22 LAB
A1 MICROGLOB PLACENTAL VAG QL: NEGATIVE
APPEARANCE UR: CLEAR
COLOR UR AUTO: YELLOW
GLUCOSE UR QL STRIP.AUTO: NEGATIVE MG/DL
KETONES UR QL STRIP.AUTO: NEGATIVE MG/DL
LEUKOCYTE ESTERASE UR QL STRIP.AUTO: ABNORMAL
NITRITE UR QL STRIP.AUTO: NEGATIVE
PH UR STRIP.AUTO: 7 [PH]
PROT UR QL STRIP: NEGATIVE MG/DL
RBC UR QL AUTO: NEGATIVE
SP GR UR: 1.01

## 2019-07-22 PROCEDURE — 84112 EVAL AMNIOTIC FLUID PROTEIN: CPT

## 2019-07-22 PROCEDURE — 81002 URINALYSIS NONAUTO W/O SCOPE: CPT

## 2019-07-22 NOTE — DISCHARGE INSTRUCTIONS
General Instructions:  · If you think you are in labor, time contractions (lying on your left side) from the beginning of one contraction to the beginning of the next contraction for at least one hour.  · Increase fluid intake: you should consume 10-12 8 oz glasses of non-caffeinated fluid per day.  · Report any pressure or burning on urination to your physician.  · Monitor fetal movement: If you notice an absence or decrease in fetal movement, drink a large glass of water and rest on your side.  If there is no increase in movement, call your physician or go to the hospital for further evaluation.  · Report any sudden, sharp abdominal pain.  · Report any bleeding.  Spotting or pinkish discharge is normal after vaginal exam.  You may also spot after sexual intercourse.    Pre-term Labor (<37 weeks):  Call your physician or return to the hospital if:  · You have painless regular contractions more than 4 in one hour.  · Your water breaks (remember time and color).  · You have menstrual-like cramps, a low dull backache or pressure in your pelvis or back.  · Your baby does not move enough to complete the daily kick count (10 movements in 2 hours).  · Your baby moves much less often than on the days before or you have not felt your baby move all day.  · Please review the MEDICATION LIST section of your AFTER VISIT SUMMARY document.  · Take your medication as prescribed      Cervical Cerclage, Care After  Refer to this sheet in the next few weeks. These instructions provide you with information on caring for yourself after your procedure. Your health care provider may also give you more specific instructions. Your treatment has been planned according to current medical practices, but problems sometimes occur. Call your health care provider if you have any problems or questions after your procedure.  WHAT TO EXPECT AFTER THE PROCEDURE   After your procedure, it is typical to have the following:  · Abdominal  cramping.  · Vaginal spotting.  HOME CARE INSTRUCTIONS   · Only take over-the-counter or prescription medicines for pain, discomfort, or fever as directed by your health care provider.  · Avoid physical activities and exercise until your health care provider says it is okay.  · Do not douche or have sexual intercourse until your health care provider tells you it is okay.  · Keep your follow-up surgical and prenatal appointments with your health care provider.  SEEK MEDICAL CARE IF:   · You have abnormal vaginal discharge.  · You have a rash.  · You become lightheaded or feel faint.  · You have abdominal pain that is not controlled with pain medicine.  SEEK IMMEDIATE MEDICAL CARE IF:   · You develop vaginal bleeding.  · You are leaking fluid or have a gush of fluid from the vagina.  · You have a fever.  · You faint.  · You have uterine contractions.  · You feel your baby is not moving as much as usual, or you cannot feel your baby move.  · You have chest pain or shortness of breath.     This information is not intended to replace advice given to you by your health care provider. Make sure you discuss any questions you have with your health care provider.     Document Released: 10/08/2014 Document Revised: 12/23/2014 Document Reviewed: 10/08/2014  [employer] Interactive Patient Education ©2016 [employer]

## 2019-07-22 NOTE — PROGRESS NOTES
OMEGA 20 EGA 15.6    PT has Cerclage placed on  by Jeremias. She presents with CO UC's Q15. Denies bleeding . She stated she called Sheri's office and they told her to come to labor and delivery.    1540 Working at , sterile speculum placed and cerclage intact. Amnisure collected.     1615 MD reviewed amnisure results okay to DC.    1635 PT to return for leaking, bleeding or UC's.

## 2019-07-31 NOTE — CONSULTS
DATE OF SERVICE:  2019    HISTORY OF PRESENT ILLNESS:  This a 23-year-old  5, para 2 who is   currently at 15 weeks.  She had a cervical cerclage placed last week by Dr. Mcdowell.  She presents to the hospital with some mild cramping and possible loss   of fluid.  She denies any vaginal bleeding.    Prenatal care has been with Dr. Wade and Dr. Mcdowell.  She is blood type O   positive, rubella immune.    PAST MEDICAL HISTORY:  None.    PAST SURGICAL HISTORY:  Cerclage,  section and dilation and   evacuation.    GYNECOLOGIC HISTORY:  Not reviewed.    OBSTETRICAL HISTORY:  Patient is a  5, para 2-0-2-2.  She had 1   therapeutic .  She had 1  delivery of a fetus at 18 weeks.  She   had 1  delivery via  at 36 weeks and she had 1 vaginal   delivery at 34 weeks.   5 is current pregnancy.    SOCIAL HISTORY:  She denies tobacco, alcohol or drugs.    OBJECTIVE:  VITAL SIGNS:  Afebrile.  Vital signs stable.  GENERAL:  She is a well-developed, well-nourished female in no acute distress.  ABDOMEN:  Gravid.  Fetal heart tones present.  No palpable contractions.    Tocometer is unremarkable.  Sterile speculum exam shows no pooling.  Cerclage visually  appears intact and does not have any bleeding present and does not   appear to be on tension.  Sterile vaginal exam confirms that the cerclage is   intact without significant tension.  AmniSure testing was negative.    ASSESSMENT AND PLAN:  This is a 23-year-old  5, para 2 at 15 weeks,   here for cramping and possible loss of fluid on cerclage.  Patient has been   reassured cerclage appears to be intact, we do not see any evidence of   cramping on exam and there is no evidence of rupture currently.  She will   follow up with Dr. Wade or Dr. Mcdowell for routine evaluation.       ____________________________________     MD HUMBLE NULL / VIJAYA    DD:  2019 19:33:04  DT:  2019 20:16:31    D#:   [id number]  Job#:  [id number]

## 2019-08-20 ENCOUNTER — HOSPITAL ENCOUNTER (OUTPATIENT)
Facility: MEDICAL CENTER | Age: 24
End: 2019-08-20
Attending: OBSTETRICS & GYNECOLOGY | Admitting: OBSTETRICS & GYNECOLOGY
Payer: MEDICAID

## 2019-08-20 VITALS
HEART RATE: 87 BPM | BODY MASS INDEX: 30.99 KG/M2 | RESPIRATION RATE: 17 BRPM | SYSTOLIC BLOOD PRESSURE: 127 MMHG | HEIGHT: 65 IN | TEMPERATURE: 96.7 F | DIASTOLIC BLOOD PRESSURE: 73 MMHG | WEIGHT: 186 LBS

## 2019-08-20 LAB
APPEARANCE UR: CLEAR
COLOR UR AUTO: YELLOW
GLUCOSE UR QL STRIP.AUTO: NEGATIVE MG/DL
KETONES UR QL STRIP.AUTO: NEGATIVE MG/DL
LEUKOCYTE ESTERASE UR QL STRIP.AUTO: ABNORMAL
NITRITE UR QL STRIP.AUTO: NEGATIVE
PH UR STRIP.AUTO: 6 [PH] (ref 5–8)
PROT UR QL STRIP: NEGATIVE MG/DL
RBC UR QL AUTO: NEGATIVE
SP GR UR: <=1.005 (ref 1–1.03)

## 2019-08-20 PROCEDURE — 81002 URINALYSIS NONAUTO W/O SCOPE: CPT

## 2019-08-21 NOTE — DISCHARGE INSTRUCTIONS
General Instructions:  · If you think you are in labor, time contractions (lying on your left side) from the beginning of one contraction to the beginning of the next contraction for at least one hour.  · Increase fluid intake: you should consume 10-12 8 oz glasses of non-caffeinated fluid per day.  · Report any pressure or burning on urination to your physician.  · Monitor fetal movement: If you notice an absence or decrease in fetal movement, drink a large glass of water and rest on your side.  If there is no increase in movement, call your physician or go to the hospital for further evaluation.  · Report any sudden, sharp abdominal pain.  · Report any bleeding.  Spotting or pinkish discharge is normal after vaginal exam.  You may also spot after sexual intercourse.    Pre-term Labor (<37 weeks):  Call your physician or return to the hospital if:  · You have painless regular contractions more than 4 in one hour.  · Your water breaks (remember time and color).  · You have menstrual-like cramps, a low dull backache or pressure in your pelvis or back.  · Your baby does not move enough to complete the daily kick count (10 movements in 2 hours).  · Your baby moves much less often than on the days before or you have not felt your baby move all day.  · Please review the MEDICATION LIST section of your AFTER VISIT SUMMARY document.  · Take your medication as prescribed      Cervical Cerclage  Cervical cerclage is a surgical procedure to correct a cervix that opens up and thins out before pregnancy is at term (cervical insufficiency, also called incompetent cervix). This condition can cause labor to start early (prematurely). This procedure involves using stitches to sew the cervix shut during pregnancy.  Your surgeon may use ultrasound equipment to help guide the procedure and monitor your baby. Ultrasound equipment uses sound waves to take images of your cervix and uterus. Your surgeon will assess these images on a  monitor in the operating room.  Tell a health care provider about:  · Any allergies you have, especially any allergies related to prescribed medicine, stitches, or anesthetic medicines.  · All medicines you are taking, including vitamins, herbs, eye drops, creams, and over-the-counter medicines. Bring a list of all of your medicines to your appointment.  · Your medical history, including prior labor deliveries.  · Any problems you or family members have had with anesthetic medicines.  · Any blood disorders you have.  · Any surgeries you have had, including prior cervical stitching.  · Any medical conditions you have.  · Whether you are pregnant or may be pregnant.  What are the risks?  Generally, this is a safe procedure. However, problems may occur, including:  · Infection, such as infection of the cervix or amniotic sac.  · Vaginal bleeding.  · Allergic reactions to medicines.  · Damage to other structures or organs, such as tearing (rupture) of membranes or cervical laceration.  · Premature contractions including going into early labor and delivery.  · Cervical dystocia, which occurs when the cervix is unable to dilate normally during labor.  What happens before the procedure?  Staying hydrated  Follow instructions from your health care provider about hydration, which may include:  · Up to 2 hours before the procedure - you may continue to drink clear liquids, such as water, clear fruit juice, black coffee, and plain tea.  Eating and drinking restrictions  Follow instructions from your health care provider about eating and drinking, which may include:  · 8 hours before the procedure - stop eating heavy meals or foods such as meat, fried foods, or fatty foods.  · 6 hours before the procedure - stop eating light meals or foods, such as toast or cereal.  · 6 hours before the procedure - stop drinking milk or drinks that contain milk.  · 2 hours before the procedure - stop drinking clear liquids.  Medicines  · Ask  your health care provider about:  ¨ Changing or stopping your regular medicines. This is especially important if you are taking diabetes medicines or blood thinners.  ¨ Taking medicines such as aspirin and ibuprofen. These medicines can thin your blood. Do not take these medicines before your procedure if your health care provider instructs you not to.  · You may be given antibiotic medicine to help prevent infection.  General instructions  · Do not put on any lotion, deodorant, or perfume.  · Remove contact lenses and jewelry.  · Ask your health care provider how your surgical site will be marked or identified.  · You may have an exam or testing.  · You may have a blood or urine sample taken.  · Plan to have someone take you home from the hospital or clinic.  · If you will be going home right after the procedure, plan to have someone with you for 24 hours.  What happens during the procedure?  · To reduce your risk of infection:  ¨ Your health care team will wash or sanitize their hands.  ¨ Your skin will be washed with soap.  · An IV tube will be inserted into one of your veins.  · You may be given one or more of the following:  ¨ A medicine to help you relax (sedative).  ¨ A medicine to numb the area (local anesthetic).  ¨ A medicine to make you fall asleep (general anesthetic).  ¨ A medicine that is injected into your spine to numb the area below and slightly above the injection site (spinal anesthetic).  ¨ A medicine that is injected into an area of your body to numb everything below the injection site (regional anesthetic).  · A lubricated instrument (speculum) will be inserted into your vagina. The speculum will be widened to open the walls of your vagina so your surgeon can see your cervix.  · Your cervix will be grasped and tightly stitched closed (sutured). To do this, your surgeon will stitch a strong band of thread around your cervix, then the thread will be tightened to hold your cervix shut.  The  procedure may vary among health care providers and hospitals.  What happens after the procedure?  · Your blood pressure, heart rate, breathing rate, and blood oxygen level will be monitored until the medicines you were given have worn off. You will be monitored for premature contractions.  · You may have light bleeding and mild cramping.  · You may have to wear compression stockings. These stockings help to prevent blood clots and reduce swelling in your legs.  · Do not drive for 24 hours if you received a sedative.  · You may be put on bed rest.  · You may be given medicine to prevent infection.  · You may be given an injection of a hormone (progesterone) to prevent your uterus from tightening (lucita).  Summary  · Cervical cerclage is a surgical procedure that involves using stitches to sew the cervix shut during pregnancy.  · Your blood pressure, heart rate, breathing rate, and blood oxygen level will be monitored until the medicines you were given have worn off. You will be monitored for premature contractions.  · You may need to be on bed rest after the procedure.  · Plan to have someone take you home from the hospital or clinic.  This information is not intended to replace advice given to you by your health care provider. Make sure you discuss any questions you have with your health care provider.  Document Released: 11/30/2009 Document Revised: 08/11/2017 Document Reviewed: 08/03/2017  [employer] Patient Education © 2017 [employer]      Other Instructions:  Please carefully review your entire AFTER VISIT SUMMARY document for all discharge instructions.

## 2019-08-21 NOTE — PROGRESS NOTES
DATE OF SERVICE:    CHIEF COMPLAINT:  Cramping and pelvic pressure.    HISTORY OF PRESENT ILLNESS:  This is a 23-year-old  5, para 2 female   who was seen at 20 weeks' gestation with complaints of pelvic pressure and   pain.  She also described increased amount of discharge.    PRENATAL HISTORY:  Significant for having had a cerclage placed by Dr. Mcdowell.    This was placed for cervical funneling.  She has been discharged from his   care.  She denies any leaking of fluid or vaginal bleeding.  She sees Dr. Wade for routine care.    OBSTETRICAL HISTORY:  Notable for 2 prior  births at 34 and 36 weeks'   gestation.  She has had 1  section, 1 .    PHYSICAL EXAMINATION:  On evaluation to labor and delivery, she was on a fetal   monitor, which showed the presence of fetal heart tones.  No evidence of   uterine activity.  The abdomen is gravid and nontender.  I checked her cervix   and her cervix is closed with the cerclage intact.    IMPRESSION:  Pregnancy at 20 weeks' gestation with cervical cerclage in place.    PLAN:  The patient is reassured, will require no further management or   intervention at this time, will follow up with Dr. Wade within the next   scheduled office visit in a week.       ____________________________________     MD JANEL TAYLOR / VIJAYA    DD:  2019 19:55:33  DT:  2019 02:23:27    D#:  [id number]  Job#:  [id number]

## 2019-08-21 NOTE — PROGRESS NOTES
22 yo      -Pt presetns to L&D c/o pain/pressure that started this morning and became more severe around 3:00 pm. Pt c/o increase in discharge, denies LOF or VB and confirms +FM, pain 7/10. Hx of PTL x2 (34 wk & 36 wk GA), cerclage x2, c/s x1 and  x1. Pt currently has a cerclage in place. Pt stated funneling on US was noted at Dr. Mcdowell's office on .   -T by doppler 140  -Report given to Dr. Carroll in department, updated on pt arrival/complaint/status. MD to see pt  -Dr. Carroll at bedside, SVE performed by provider, orders to discharge home and follow up with Dr. Wade this week  -Pt discharged home with FOB. Provided general instructions, PTL precautions and information on cervical cerclages, understanding verbalized

## 2019-09-18 ENCOUNTER — HOSPITAL ENCOUNTER (OUTPATIENT)
Facility: MEDICAL CENTER | Age: 24
End: 2019-09-18
Attending: OBSTETRICS & GYNECOLOGY | Admitting: OBSTETRICS & GYNECOLOGY
Payer: MEDICAID

## 2019-09-18 LAB
APPEARANCE UR: CLEAR
COLOR UR AUTO: YELLOW
GLUCOSE UR QL STRIP.AUTO: NEGATIVE MG/DL
KETONES UR QL STRIP.AUTO: NEGATIVE MG/DL
LEUKOCYTE ESTERASE UR QL STRIP.AUTO: NEGATIVE
NITRITE UR QL STRIP.AUTO: NEGATIVE
PH UR STRIP.AUTO: 7 [PH] (ref 5–8)
PROT UR QL STRIP: NEGATIVE MG/DL
RBC UR QL AUTO: NEGATIVE
SP GR UR: 1.01 (ref 1–1.03)

## 2019-09-18 PROCEDURE — 81002 URINALYSIS NONAUTO W/O SCOPE: CPT

## 2019-10-30 ENCOUNTER — HOSPITAL ENCOUNTER (OUTPATIENT)
Facility: MEDICAL CENTER | Age: 24
End: 2019-10-30
Attending: OBSTETRICS & GYNECOLOGY | Admitting: OBSTETRICS & GYNECOLOGY
Payer: MEDICAID

## 2019-10-30 VITALS
HEIGHT: 65 IN | TEMPERATURE: 97.6 F | BODY MASS INDEX: 32.32 KG/M2 | WEIGHT: 194 LBS | HEART RATE: 77 BPM | DIASTOLIC BLOOD PRESSURE: 65 MMHG | SYSTOLIC BLOOD PRESSURE: 117 MMHG

## 2019-10-30 LAB
APPEARANCE UR: CLEAR
BACTERIA GENITAL QL WET PREP: NORMAL
COLOR UR AUTO: YELLOW
GLUCOSE UR QL STRIP.AUTO: NEGATIVE MG/DL
KETONES UR QL STRIP.AUTO: NEGATIVE MG/DL
LEUKOCYTE ESTERASE UR QL STRIP.AUTO: NEGATIVE
NITRITE UR QL STRIP.AUTO: NEGATIVE
PH UR STRIP.AUTO: 7 [PH] (ref 5–8)
PROT UR QL STRIP: NEGATIVE MG/DL
RBC UR QL AUTO: NEGATIVE
SIGNIFICANT IND 70042: NORMAL
SITE SITE: NORMAL
SOURCE SOURCE: NORMAL
SP GR UR: 1.01 (ref 1–1.03)

## 2019-10-30 PROCEDURE — 81002 URINALYSIS NONAUTO W/O SCOPE: CPT

## 2019-10-30 PROCEDURE — 59025 FETAL NON-STRESS TEST: CPT

## 2019-10-30 NOTE — PROGRESS NOTES
, EDC , GA 30w1d. Pt presents to L&D with c/o mild UCs 3 min apart. Pt denies LOF or VB and reports + fetal movement. Pt escorted to triage room, oriented to room and unit, and external monitors applied. POC discussed with pt and encouraged to state needs or questions at any time.    1000 Dr. Up on unit, report given.    1036 Speculum exam by Dr. Up, thick white vaginal discharge noted. Sample collected for wet prep.    1111 General L&D D/C instructions reviewed with pt who states understanding. Pt d/c to self.

## 2019-10-31 NOTE — H&P
DATE OF ADMISSION:  10/30/2019    PATIENT IDENTIFICATION:  The patient is a 24-year-old  5, para 0-2-2-2   at 30 weeks and 1 day by last menstrual period,  EDC 2020.    CHIEF COMPLAINT:  Abdominal cramping.    HISTORY OF PRESENT ILLNESS:  The patient presents today with a complaint of   abdominal cramping for several days.  She states that she does not feel like   she is having contractions; however, she has been having some abdominal pain   and given her prior history of  delivery, she elected to be examined.    Patient with a prior 18-week loss during her first pregnancy. In her second pregnancy  A cerclage was placed and she delivered via  section at 36 weeks. Her third  Pregnancy was a  birth at 34 weeks.  During this pregnancy, she had a cerclage   placed.  Today, she denies vaginal bleeding, contractions and endorses positive fetal   movement.  Denies dysuria or abnormal vaginal discharge.  Denies headache, changes   in vision, right upper quadrant pain, shortness of breath, chest pain, nausea, vomiting,   diarrhea,  constipation, or dysuria.  Reports she has been drinking a lot of water.  Her   last bowel movement was yesterday.  She denies constipation.  States that the  pain she is feeling may be gas pain; however, she wanted to be evaluated   again due to her prior history.  She has prenatal care with Dr. Corinne Capurro and has thus far her pregnancy has been largely uncomplicated other than   cerclage placement.    REVIEW OF SYSTEMS:  See above.    PAST MEDICAL HISTORY:  Denies.    PAST SURGICAL HISTORY:     section x1   Dilation and evacuation  Cerclage placement x2    MEDICATIONS:  Prenatal vitamin.    ALLERGIES:  No known drug allergies.    FAMILY HISTORY:  Father with diabetes.  Father and mother with elevated   cholesterol.  Sister with leukemia.  Denies family history of bleeding or   clotting disorders.  Denies family history of breast or ovarian  cancer.    GYNECOLOGIC HISTORY:  Last menstrual period 2019.  Denies history of   STDs or genital herpes.    OBSTETRICAL HISTORY:  G1:  In , 18-week  birth, status post   dilation and evacuation. G2:  In , 36 weeks, 8 pounds 2 ounces, ,   cerclage.  In , 34 weeks, 6 pounds 11 ounces, vaginal birth after    section.  In 2019, current, cerclage in place and GDMA1.    SOCIAL HISTORY:  Denies tobacco use, alcohol use, or drug use.    PHYSICAL EXAMINATION:  VITAL SIGNS:  Temperature 36.4, heart rate 77, blood pressure 117/65, and   weight 88 kilograms.  GENERAL:  Alert, conversational, pleasant, and in no acute distress.  CARDIOVASCULAR:  Regular rate and rhythm.  PULMONARY:  Clear to auscultation bilaterally.  No respiratory distress.  ABDOMEN:  Soft, nontender, gravid, and nondistended.  GENITOURINARY:  Sterile speculum exam reveals closed cervix with cerclage   suture visualized and noted to be in place.  No vaginal bleeding.  Moderate   amount of vaginal discharge.  EXTREMITIES:  No edema.  Moves all.    Fetal heart tracing 120s, positive accelerations, no decelerations, moderate   variability. Tocometer mostly quiet with occasional irritability.    LABORATORY DATA:  Wet prep negative.    Urinalysis negative for leukocyte esterase or nitrites.    Prenatal labs were reviewed.  Gonorrhea and chlamydia negative.  Hepatitis B   surface antigen nonreactive, RPR nonreactive, rubella immune, O positive,   antibody negative, HIV nonreactive. Urine culture negative. AFP negative.  1-hour glucose tolerance test 158.  3-hour glucose tolerance test declined.    DIAGNOSTIC DATA:  Anatomy ultrasound reviewed, normal anatomy with posterior placenta.  No placenta previa.     ASSESSMENT AND PLAN:  This is a 24-year-old  5, para 0-2-2-2 at 30   weeks and 1 day by last menstrual period, estimated due date 2020.    ASSESSMENT:  1. Intrauterine pregnancy at 30 weeks and 1 day.  2.  Abdominal pain.  3. History of  birth x3.  4. History of  section x1.  5. History of vaginal birth after  section.  6. Cerclage in place.  7. Gestational diabetes A1.    DISCUSSION:  The patient is here today with cramping abdominal pain.  Denies   contractions or vaginal bleeding.  Sterile speculum exam revealed a closed   cervix with cerclage suture in place.  There was a moderate amount of vaginal   discharge, which was collected and sent for wet prep, which returned negative.  Her urinalysis showed no evidence of urinary tract infection.  The tocometer  was grossly quiet while the patient was in triage for 2 hours.  The patient   reports that she is having bowel movements; however, does think it is possible  that her stomach is simply upset.      PLAN:  1. Okay to discharge home.  2. Follow up with Dr. Corinne Capurro at next scheduled appointment.  3. Return to labor and delivery triage if worsening pain, decreased fetal   movement, vaginal bleeding, loss of fluid, or any other concerns.             ____________________________________     MD REBECCA Martinez / VIJAYA    DD:  10/30/2019 22:04:18  DT:  10/30/2019 23:09:24    D#:  [id number]  Job#:  [id number]

## 2019-11-09 ENCOUNTER — HOSPITAL ENCOUNTER (OUTPATIENT)
Facility: MEDICAL CENTER | Age: 24
End: 2019-11-10
Attending: OBSTETRICS & GYNECOLOGY | Admitting: OBSTETRICS & GYNECOLOGY
Payer: MEDICAID

## 2019-11-09 VITALS
DIASTOLIC BLOOD PRESSURE: 62 MMHG | BODY MASS INDEX: 32.32 KG/M2 | HEART RATE: 85 BPM | RESPIRATION RATE: 16 BRPM | WEIGHT: 194 LBS | SYSTOLIC BLOOD PRESSURE: 111 MMHG | TEMPERATURE: 97.1 F | HEIGHT: 65 IN

## 2019-11-09 LAB — CRYSTALS AMN MICRO: NORMAL

## 2019-11-09 PROCEDURE — 89060 EXAM SYNOVIAL FLUID CRYSTALS: CPT

## 2019-11-09 PROCEDURE — 59025 FETAL NON-STRESS TEST: CPT

## 2019-11-10 NOTE — PROGRESS NOTES
OB MD NST Report Review:    Tricia Rees, a  at 31w5d with an OMEGA of 2020, by Last Menstrual Period, was seen at [facility] for a nonstress test.    Nonstress Test  Reason for NST: Labor Evaluation  Variability: Moderate  Decelerations: None  Accelerations: Yes  Acoustic Stimulator: No  Baseline: 130  Contractions: Irregular  Nonstress Test Interpretation  $ Nonstress Test Interpretation - Fetus A: Reactive  NST Interpreted By: jimbo espinal rn    NST reactive, cat 1 FHR    A: 31.5 weeks, no evidence of ruptured membranes,  contractions resolved    P: Pt discharged home, labor precautions, FKC, return prn concerns

## 2019-11-10 NOTE — PROGRESS NOTES
edc   31.4 weeks    History: cerclage, c/s x1,  x1    Complaints: srom    2300: pt to labor and delivery c/o leaking of fluid starting this evening.  Pt denies vaginal bleeding.  Pt reports some cramping.  efm and toco applied. Vss.  Sterile speculum done, white discharge noted in vagina. Fern sent to lab.    2340: call to dr. Lao.  Fern negative per lab.  uc's noted on toco every 4 minutes. Orders received for terbutaline and oral hydration.  Pt declines terbutaline at this time and would like to try rest and oral hydration over the next hour.     0030: pt reports occasional cramping, pt falling asleep and would like to go home.  Report to dr. Crisostomo.  Orders to discharge home with  labor precautions.

## 2019-11-27 ENCOUNTER — HOSPITAL ENCOUNTER (OUTPATIENT)
Facility: MEDICAL CENTER | Age: 24
End: 2019-11-27
Attending: OBSTETRICS & GYNECOLOGY | Admitting: OBSTETRICS & GYNECOLOGY
Payer: MEDICAID

## 2019-11-27 VITALS
SYSTOLIC BLOOD PRESSURE: 118 MMHG | BODY MASS INDEX: 33.12 KG/M2 | WEIGHT: 194 LBS | HEART RATE: 86 BPM | HEIGHT: 64 IN | TEMPERATURE: 98.2 F | DIASTOLIC BLOOD PRESSURE: 71 MMHG

## 2019-11-27 LAB
APPEARANCE UR: CLEAR
COLOR UR AUTO: YELLOW
FIBRONECTIN FETAL SPEC QL: NEGATIVE
GLUCOSE UR QL STRIP.AUTO: NEGATIVE MG/DL
KETONES UR QL STRIP.AUTO: NEGATIVE MG/DL
LEUKOCYTE ESTERASE UR QL STRIP.AUTO: NEGATIVE
NITRITE UR QL STRIP.AUTO: NEGATIVE
PH UR STRIP.AUTO: 7 [PH] (ref 5–8)
PROT UR QL STRIP: NEGATIVE MG/DL
RBC UR QL AUTO: NEGATIVE
SP GR UR: 1.01 (ref 1–1.03)

## 2019-11-27 PROCEDURE — 700111 HCHG RX REV CODE 636 W/ 250 OVERRIDE (IP): Performed by: OBSTETRICS & GYNECOLOGY

## 2019-11-27 PROCEDURE — 59025 FETAL NON-STRESS TEST: CPT

## 2019-11-27 PROCEDURE — 81002 URINALYSIS NONAUTO W/O SCOPE: CPT

## 2019-11-27 PROCEDURE — 96372 THER/PROPH/DIAG INJ SC/IM: CPT

## 2019-11-27 PROCEDURE — 82731 ASSAY OF FETAL FIBRONECTIN: CPT

## 2019-11-27 RX ORDER — BETAMETHASONE SODIUM PHOSPHATE AND BETAMETHASONE ACETATE 3; 3 MG/ML; MG/ML
12 INJECTION, SUSPENSION INTRA-ARTICULAR; INTRALESIONAL; INTRAMUSCULAR; SOFT TISSUE ONCE
Status: COMPLETED | OUTPATIENT
Start: 2019-11-27 | End: 2019-11-27

## 2019-11-27 RX ADMIN — BETAMETHASONE SODIUM PHOSPHATE AND BETAMETHASONE ACETATE 12 MG: 3; 3 INJECTION, SUSPENSION INTRA-ARTICULAR; INTRALESIONAL; INTRAMUSCULAR at 19:00

## 2019-11-27 NOTE — PROGRESS NOTES
25 y/o , EDC , EGA 34.1. Pt here for UCs and cramping.  Pt states +FM, denies vaginal LOF/bleeding.   Pt has history of cerclage placement 19. Also has history of c/s with first delivery and  with second. Pt feeling cramping and UCs but none picked up on monitor.   Dr. Choudhury updated on pt, orders to send FFN and check cervix. SVE: cl/thick/high. Dr. Choudhury at bedside, MD also preformed SVE: cl/high. FFN negative. Plan is to watch pt for a few hours.   Dr. Choudhury updated. Plan to continue to watch pt till 1800.   1620 - Report given to Tevin LANGLEY.

## 2019-11-28 ENCOUNTER — HOSPITAL ENCOUNTER (OUTPATIENT)
Facility: MEDICAL CENTER | Age: 24
End: 2019-11-28
Attending: OBSTETRICS & GYNECOLOGY | Admitting: OBSTETRICS & GYNECOLOGY
Payer: MEDICAID

## 2019-11-28 VITALS
HEART RATE: 103 BPM | HEIGHT: 64 IN | DIASTOLIC BLOOD PRESSURE: 69 MMHG | SYSTOLIC BLOOD PRESSURE: 117 MMHG | WEIGHT: 194 LBS | BODY MASS INDEX: 33.12 KG/M2

## 2019-11-28 PROCEDURE — 700111 HCHG RX REV CODE 636 W/ 250 OVERRIDE (IP): Performed by: OBSTETRICS & GYNECOLOGY

## 2019-11-28 PROCEDURE — 59025 FETAL NON-STRESS TEST: CPT

## 2019-11-28 PROCEDURE — 96372 THER/PROPH/DIAG INJ SC/IM: CPT

## 2019-11-28 RX ORDER — BETAMETHASONE SODIUM PHOSPHATE AND BETAMETHASONE ACETATE 3; 3 MG/ML; MG/ML
12 INJECTION, SUSPENSION INTRA-ARTICULAR; INTRALESIONAL; INTRAMUSCULAR; SOFT TISSUE ONCE
Status: COMPLETED | OUTPATIENT
Start: 2019-11-28 | End: 2019-11-28

## 2019-11-28 RX ADMIN — BETAMETHASONE SODIUM PHOSPHATE AND BETAMETHASONE ACETATE 12 MG: 3; 3 INJECTION, SUSPENSION INTRA-ARTICULAR; INTRALESIONAL; INTRAMUSCULAR at 19:29

## 2019-11-28 NOTE — H&P
History and physical     2019    Chief complaint:   Patient presented for contractions    History of present illness: Patient is a 24-year-old female  5 para 2 who is estimated due date is  making her 34 weeks.  Patient currently has a cerclage in place.  She has had deliveries at 1834 and 36 weeks.  She had a previous  at 36 weeks she cannot recall why.  She did have a vaginal birth after  after that delivery.  She has been treated for an incompetent cervix.    Patient started lucita around 6 AM this morning and states she was lucita every 3 to 4 minutes till around 11 AM so she presented for evaluation.  Since she has been here she is contracted around 7 to 10 minutes.  Her cervix is closed on exam.    She has diet-controlled gestational diabetes    Medical history: Benign    Surgical history:  section    Habits: Patient denies tobacco alcohol or drug use    Allergies: No known drug allergies    Medications: None    Obstetric history: Patient is Rh+, rubella immune, diet-controlled gestational diabetes    Family history: NoncontributoryTo current problem       Physical exam  Vitals: Normal  General: Patient is awake alert pleasant  Head: Atraumatic normocephalic  Abdomen gravid: Abdomen is soft nontender gravid  Pelvic: Cervix is closed is difficult to ascertain the thickness but seems about 50% effaced  Extremities: Normal    Assessment:  1-34-week intrauterine pregnancy  2-incompetent cervix  3-previous  section  4-history of  deliveries    5- contractions    Plan:  Fetal fibronectin was negative.  Because of her history of  delivery we will watch patient over the next several hours.  We did discuss the option of terbutaline and its pros and cons.  Patient declined.  If contractions persist we may consider steroid treatment.

## 2019-11-28 NOTE — PROGRESS NOTES
1630  Report from Autumn RN in room with pt at this time and POC discussed, pt states understanding at this time.  1800  Pt reports that the UC are similar to before.  Dr. Choudhury phoned and he will come see pt shortly.  1825  Dr. Choudhury in room and POC discussed, orders received at this time and awaiting steroid injection.   Pt states understanding at this time.  1855  Jean RN in room, steroid injection given at this time.  1910  Pt into regular clothing and to home at this time ambulatory by herself.  Pt understands to return tomorrow for her follow up steroid injection, cervical exam and baby monitoring.  Pt also understands to return if she is having more UC's that are getting stronger.  Pt understands to return for decreased fetal movement.  Pt has no further questions at this time.

## 2019-11-29 NOTE — PROGRESS NOTES
1900 patient here for second betamethasone injection  1930 Dr Carroll called and orders received to IN home  1932 discharge instructions provided and when to return to labor and delivery  1935 patient off floor stable on feet.

## 2019-12-01 ENCOUNTER — HOSPITAL ENCOUNTER (EMERGENCY)
Facility: MEDICAL CENTER | Age: 24
End: 2019-12-02
Attending: EMERGENCY MEDICINE
Payer: MEDICAID

## 2019-12-01 DIAGNOSIS — J10.1 INFLUENZA B: ICD-10-CM

## 2019-12-01 PROCEDURE — 87081 CULTURE SCREEN ONLY: CPT

## 2019-12-01 PROCEDURE — 87502 INFLUENZA DNA AMP PROBE: CPT

## 2019-12-01 PROCEDURE — 99284 EMERGENCY DEPT VISIT MOD MDM: CPT

## 2019-12-02 VITALS
OXYGEN SATURATION: 98 % | TEMPERATURE: 98.5 F | WEIGHT: 197.31 LBS | SYSTOLIC BLOOD PRESSURE: 122 MMHG | DIASTOLIC BLOOD PRESSURE: 64 MMHG | HEIGHT: 64 IN | RESPIRATION RATE: 18 BRPM | HEART RATE: 89 BPM | BODY MASS INDEX: 33.69 KG/M2

## 2019-12-02 LAB
FLUAV RNA SPEC QL NAA+PROBE: NEGATIVE
FLUBV RNA SPEC QL NAA+PROBE: POSITIVE

## 2019-12-02 PROCEDURE — A9270 NON-COVERED ITEM OR SERVICE: HCPCS | Performed by: EMERGENCY MEDICINE

## 2019-12-02 PROCEDURE — 700102 HCHG RX REV CODE 250 W/ 637 OVERRIDE(OP): Performed by: EMERGENCY MEDICINE

## 2019-12-02 RX ORDER — OSELTAMIVIR PHOSPHATE 75 MG/1
75 CAPSULE ORAL 2 TIMES DAILY
Qty: 10 CAP | Refills: 0 | Status: ON HOLD | OUTPATIENT
Start: 2019-12-02 | End: 2019-12-25

## 2019-12-02 RX ORDER — OSELTAMIVIR PHOSPHATE 75 MG/1
75 CAPSULE ORAL ONCE
Status: COMPLETED | OUTPATIENT
Start: 2019-12-02 | End: 2019-12-02

## 2019-12-02 RX ORDER — OSELTAMIVIR PHOSPHATE 75 MG/1
75 CAPSULE ORAL 2 TIMES DAILY
Qty: 10 CAP | Refills: 0 | Status: SHIPPED | OUTPATIENT
Start: 2019-12-02 | End: 2019-12-02 | Stop reason: SDUPTHER

## 2019-12-02 RX ADMIN — OSELTAMIVIR PHOSPHATE 75 MG: 75 CAPSULE ORAL at 00:49

## 2019-12-02 NOTE — ED NOTES
Uric from Lab called with critical result of Influenza B at 0037. Critical lab result read back to Uric.   Dr. Robert notified of critical lab result at 0037.  Critical lab result read back by Dr. Robert.

## 2019-12-02 NOTE — ED PROVIDER NOTES
"CHIEF COMPLAINT  Chief Complaint   Patient presents with   • Sore Throat     x2 days   • Congestion     x2 days       HPI  Tricia Rees is a 24 y.o. female who presents with sore throat and congestion over the past 2 days.  Occasional cough and runny nose.  No known sick contacts.  Denies influenza shot this year.  She states that she is 35 weeks pregnant and has had incompetent cervix with cerclage done.  Denies any belly pain, vomiting.  No vaginal bleeding or discharge.  No no active fevers.    REVIEW OF SYSTEMS  See HPI for further details. All other systems are negative.     PAST MEDICAL HISTORY   has a past medical history of  treated with cerclage at 14 weeks gestation (1/12/2012), Gestational diabetes, HBsAg (hepatitis B surface antigen) positive, currently pregnant (HCC) (1/18/2012), Incompetent cervix in pregnancy (1/12/2012), and Powell cerclage present (1/12/2012).    SOCIAL HISTORY  Social History     Tobacco Use   • Smoking status: Never Smoker   • Smokeless tobacco: Never Used   Substance and Sexual Activity   • Alcohol use: Not Currently     Comment: occassionally   • Drug use: Not Currently     Types: Inhaled     Comment: THC for menstral pains   • Sexual activity: Not Currently     Partners: Male     Comment: unplanned pregnancy FOB involved       SURGICAL HISTORY   has a past surgical history that includes cervical cerclage (  10/2011); cervical cerclage (3/24/2012); primary c section (3/25/2012); and cervical cerclage (7/12/2019).    CURRENT MEDICATIONS  Home Medications     Reviewed by Tammy Lares R.N. (Registered Nurse) on 12/01/19 at 2332  Med List Status: Not Addressed   Medication Last Dose Status   Prenatal MV-Min-Fe Fum-FA-DHA (PRENATAL 1 PO)  Active                ALLERGIES  No Known Allergies    PHYSICAL EXAM  VITAL SIGNS: /64   Pulse 89   Temp 36.9 °C (98.5 °F) (Temporal)   Resp 18   Ht 1.626 m (5' 4\")   Wt 89.5 kg (197 lb 5 oz)   LMP 04/02/2019   SpO2 98% "   BMI 33.87 kg/m²   Pulse ox interpretation: I interpret this pulse ox as normal.  Constitutional: Alert in no apparent distress.  HENT: No signs of trauma, Bilateral external ears normal, Nose normal.  Posterior pharynx is slightly erythematous however no significant tonsillar swelling or exudates.  Bilateral tympanic membranes normal.  Eyes: Pupils are equal and reactive, Conjunctiva normal, Non-icteric.   Neck: Normal range of motion, No tenderness, Supple, No stridor.   Lymphatic: No lymphadenopathy noted.   Cardiovascular: Regular rate and rhythm.   Thorax & Lungs: Normal breath sounds, No respiratory distress, No wheezing, No chest tenderness.   Abdomen: Bowel sounds normal, Soft, No tenderness, No masses, No pulsatile masses. No peritoneal signs.  Skin: Warm, Dry, No erythema, No rash.   Back: No bony tenderness, No CVA tenderness.   Extremities: Intact distal pulses, No edema, No tenderness, No cyanosis  Musculoskeletal: Good range of motion in all major joints. No tenderness to palpation or major deformities noted.   Neurologic: Alert, No focal deficits noted.       DIAGNOSTIC STUDIES / PROCEDURES      LABS  Labs Reviewed   INFLUENZA A/B BY PCR - Abnormal; Notable for the following components:       Result Value    Influenza virus B, PCR POSITIVE (*)     All other components within normal limits   GROUP A STREP BY PCR       RADIOLOGY  No orders to display       COURSE & MEDICAL DECISION MAKING    Medications   oseltamivir (TAMIFLU) capsule 75 mg (75 mg Oral Given 12/2/19 0049)       Pertinent Labs & Imaging studies reviewed. (See chart for details)  24 y.o. female presenting with sore throat and congestion over the past 2 days.  She is 35 weeks pregnant however does not have any abdominal pain symptoms.  No vomiting.  No vaginal bleeding or discharge.    Posterior pharynx is erythematous however no tonsillar swelling or exudates.  She sounds slightly congested.  Occasional dry cough.  Influenza study was  "performed showing influenza B positive.  This is consistent with her symptoms and so she was treated with Tamiflu given her pregnant status.  She was encouraged to follow-up with her primary care physician and OB for further management.  Prior to discharge, the patient is hemodynamically stable with normal vitals and no fever.    The patient was instructed to follow-up with primary care physician for further management.  To return immediately for any worsening symptoms or development of any other concerning signs or symptoms. The patient verbalizes understanding in their own words.    /64   Pulse 89   Temp 36.9 °C (98.5 °F) (Temporal)   Resp 18   Ht 1.626 m (5' 4\")   Wt 89.5 kg (197 lb 5 oz)   LMP 04/02/2019   SpO2 98%   BMI 33.87 kg/m²     The patient was referred to primary care where they will receive further BP management.      Errol Sunshine M.D.  [address]  [facility] [address]  [phone]    Schedule an appointment as soon as possible for a visit       [facility], Emergency Dept  [address]  [facility] [id number] [phone]    As needed, If symptoms worsen      FINAL IMPRESSION  1. Influenza B            Electronically signed by: Luis Robert, 12/2/2019 2:18 AM    "

## 2019-12-02 NOTE — ED TRIAGE NOTES
Patient arrives c/o sore throat, congestion, & HA x2 days; Bilateral ear fullness; Denies NV; Denies cough; states patient's daughter was recently dx with croup & pneumonia; A&O x4; ABC's intact

## 2019-12-04 LAB
S PYO SPEC QL CULT: NORMAL
SIGNIFICANT IND 70042: NORMAL
SITE SITE: NORMAL
SOURCE SOURCE: NORMAL
STREP GP B DNA PCR: NEGATIVE

## 2019-12-12 ENCOUNTER — HOSPITAL ENCOUNTER (OUTPATIENT)
Facility: MEDICAL CENTER | Age: 24
End: 2019-12-12
Attending: OBSTETRICS & GYNECOLOGY | Admitting: OBSTETRICS & GYNECOLOGY
Payer: MEDICAID

## 2019-12-12 VITALS
TEMPERATURE: 97 F | HEIGHT: 64 IN | BODY MASS INDEX: 33.46 KG/M2 | HEART RATE: 88 BPM | DIASTOLIC BLOOD PRESSURE: 68 MMHG | WEIGHT: 196 LBS | OXYGEN SATURATION: 99 % | RESPIRATION RATE: 16 BRPM | SYSTOLIC BLOOD PRESSURE: 110 MMHG

## 2019-12-12 PROCEDURE — 59025 FETAL NON-STRESS TEST: CPT

## 2019-12-13 NOTE — CONSULTS
DATE OF SERVICE:  12/12/2019    This is a patient of mine.  She is 36-2/7 weeks.  She has a history of   incompetent cervix and had a cerclage placed with this pregnancy.  I actually   removed her cerclage on Monday, 12/09/2019.  She came in today for rule out   labor.  Her cervix was checked and was found to be 2 cm.  She was observed for   2 hours and rechecked and still found to be only 2 cm.  Her fetal heart tones   on her NST were 130s and category 1 strip.  She does have uterine   contractions.  She has been sent home in stable condition and was given labor   precautions once again and was instructed to follow up with myself as   regularly scheduled.       ____________________________________     Corinne E. Capurro, MD CEC / VIJAYA    DD:  12/12/2019 16:40:43  DT:  12/12/2019 17:54:27    D#:  [id number]  Job#:  [id number]

## 2019-12-13 NOTE — PROGRESS NOTES
EDC  36      1425-pt presents from home with c/o uc's this afternoon, pt had cerclage removed earlier this week, no c/o leaking, bleeding, or other pain, states baby is moving normally, placed on external monitors, vs taken, SVE /-2, will recheck in an hour  1535-SVE no change, message left for Dr Wade  1600-TC Dr Wade, report given, discharge order received  1605-pt discharged home with labor precautions, verbalized understanding, left ambulatory with family

## 2019-12-25 ENCOUNTER — HOSPITAL ENCOUNTER (INPATIENT)
Facility: MEDICAL CENTER | Age: 24
LOS: 1 days | End: 2019-12-26
Attending: OBSTETRICS & GYNECOLOGY | Admitting: OBSTETRICS & GYNECOLOGY
Payer: MEDICAID

## 2019-12-25 LAB
BASOPHILS # BLD AUTO: 0.6 % (ref 0–1.8)
BASOPHILS # BLD: 0.08 K/UL (ref 0–0.12)
EOSINOPHIL # BLD AUTO: 0.09 K/UL (ref 0–0.51)
EOSINOPHIL NFR BLD: 0.7 % (ref 0–6.9)
ERYTHROCYTE [DISTWIDTH] IN BLOOD BY AUTOMATED COUNT: 48.3 FL (ref 35.9–50)
ERYTHROCYTE [DISTWIDTH] IN BLOOD BY AUTOMATED COUNT: 52 FL (ref 35.9–50)
GLUCOSE BLD-MCNC: 77 MG/DL (ref 65–99)
HCT VFR BLD AUTO: 34.6 % (ref 37–47)
HCT VFR BLD AUTO: 37.6 % (ref 37–47)
HGB BLD-MCNC: 11.5 G/DL (ref 12–16)
HGB BLD-MCNC: 11.8 G/DL (ref 12–16)
HOLDING TUBE BB 8507: NORMAL
IMM GRANULOCYTES # BLD AUTO: 0.16 K/UL (ref 0–0.11)
IMM GRANULOCYTES NFR BLD AUTO: 1.2 % (ref 0–0.9)
LYMPHOCYTES # BLD AUTO: 2.91 K/UL (ref 1–4.8)
LYMPHOCYTES NFR BLD: 21.1 % (ref 22–41)
MCH RBC QN AUTO: 27.5 PG (ref 27–33)
MCH RBC QN AUTO: 27.8 PG (ref 27–33)
MCHC RBC AUTO-ENTMCNC: 31.4 G/DL (ref 33.6–35)
MCHC RBC AUTO-ENTMCNC: 33.2 G/DL (ref 33.6–35)
MCV RBC AUTO: 83.6 FL (ref 81.4–97.8)
MCV RBC AUTO: 87.6 FL (ref 81.4–97.8)
MONOCYTES # BLD AUTO: 0.95 K/UL (ref 0–0.85)
MONOCYTES NFR BLD AUTO: 6.9 % (ref 0–13.4)
NEUTROPHILS # BLD AUTO: 9.58 K/UL (ref 2–7.15)
NEUTROPHILS NFR BLD: 69.5 % (ref 44–72)
NRBC # BLD AUTO: 0 K/UL
NRBC BLD-RTO: 0 /100 WBC
PLATELET # BLD AUTO: 206 K/UL (ref 164–446)
PLATELET # BLD AUTO: 239 K/UL (ref 164–446)
PMV BLD AUTO: 11.3 FL (ref 9–12.9)
PMV BLD AUTO: 11.5 FL (ref 9–12.9)
RBC # BLD AUTO: 4.14 M/UL (ref 4.2–5.4)
RBC # BLD AUTO: 4.29 M/UL (ref 4.2–5.4)
WBC # BLD AUTO: 13.8 K/UL (ref 4.8–10.8)
WBC # BLD AUTO: 15.6 K/UL (ref 4.8–10.8)

## 2019-12-25 PROCEDURE — A9270 NON-COVERED ITEM OR SERVICE: HCPCS | Performed by: OBSTETRICS & GYNECOLOGY

## 2019-12-25 PROCEDURE — 59409 OBSTETRICAL CARE: CPT

## 2019-12-25 PROCEDURE — 82962 GLUCOSE BLOOD TEST: CPT

## 2019-12-25 PROCEDURE — 770002 HCHG ROOM/CARE - OB PRIVATE (112)

## 2019-12-25 PROCEDURE — 700111 HCHG RX REV CODE 636 W/ 250 OVERRIDE (IP): Performed by: OBSTETRICS & GYNECOLOGY

## 2019-12-25 PROCEDURE — 36415 COLL VENOUS BLD VENIPUNCTURE: CPT

## 2019-12-25 PROCEDURE — 700102 HCHG RX REV CODE 250 W/ 637 OVERRIDE(OP): Performed by: OBSTETRICS & GYNECOLOGY

## 2019-12-25 PROCEDURE — 700111 HCHG RX REV CODE 636 W/ 250 OVERRIDE (IP)

## 2019-12-25 PROCEDURE — 85027 COMPLETE CBC AUTOMATED: CPT

## 2019-12-25 PROCEDURE — 85025 COMPLETE CBC W/AUTO DIFF WBC: CPT

## 2019-12-25 PROCEDURE — 304965 HCHG RECOVERY SERVICES

## 2019-12-25 PROCEDURE — 700105 HCHG RX REV CODE 258: Performed by: OBSTETRICS & GYNECOLOGY

## 2019-12-25 RX ORDER — SODIUM CHLORIDE, SODIUM LACTATE, POTASSIUM CHLORIDE, CALCIUM CHLORIDE 600; 310; 30; 20 MG/100ML; MG/100ML; MG/100ML; MG/100ML
INJECTION, SOLUTION INTRAVENOUS PRN
Status: DISCONTINUED | OUTPATIENT
Start: 2019-12-25 | End: 2019-12-26 | Stop reason: HOSPADM

## 2019-12-25 RX ORDER — OXYCODONE HYDROCHLORIDE AND ACETAMINOPHEN 5; 325 MG/1; MG/1
2 TABLET ORAL EVERY 4 HOURS PRN
Status: DISCONTINUED | OUTPATIENT
Start: 2019-12-25 | End: 2019-12-26 | Stop reason: HOSPADM

## 2019-12-25 RX ORDER — DOCUSATE SODIUM 100 MG/1
100 CAPSULE, LIQUID FILLED ORAL 2 TIMES DAILY PRN
Status: DISCONTINUED | OUTPATIENT
Start: 2019-12-25 | End: 2019-12-26 | Stop reason: HOSPADM

## 2019-12-25 RX ORDER — ACETAMINOPHEN 325 MG/1
325 TABLET ORAL EVERY 4 HOURS PRN
Status: DISCONTINUED | OUTPATIENT
Start: 2019-12-25 | End: 2019-12-26 | Stop reason: HOSPADM

## 2019-12-25 RX ORDER — OXYCODONE HYDROCHLORIDE AND ACETAMINOPHEN 5; 325 MG/1; MG/1
1 TABLET ORAL EVERY 4 HOURS PRN
Status: DISCONTINUED | OUTPATIENT
Start: 2019-12-25 | End: 2019-12-26 | Stop reason: HOSPADM

## 2019-12-25 RX ORDER — ONDANSETRON 2 MG/ML
4 INJECTION INTRAMUSCULAR; INTRAVENOUS EVERY 6 HOURS PRN
Status: DISCONTINUED | OUTPATIENT
Start: 2019-12-25 | End: 2019-12-26 | Stop reason: HOSPADM

## 2019-12-25 RX ORDER — METHYLERGONOVINE MALEATE 0.2 MG/ML
INJECTION INTRAVENOUS
Status: COMPLETED
Start: 2019-12-25 | End: 2019-12-25

## 2019-12-25 RX ORDER — MISOPROSTOL 200 UG/1
800 TABLET ORAL
Status: COMPLETED | OUTPATIENT
Start: 2019-12-25 | End: 2019-12-25

## 2019-12-25 RX ORDER — IBUPROFEN 600 MG/1
600 TABLET ORAL EVERY 6 HOURS PRN
Status: DISCONTINUED | OUTPATIENT
Start: 2019-12-25 | End: 2019-12-26 | Stop reason: HOSPADM

## 2019-12-25 RX ORDER — ALUMINA, MAGNESIA, AND SIMETHICONE 2400; 2400; 240 MG/30ML; MG/30ML; MG/30ML
30 SUSPENSION ORAL EVERY 6 HOURS PRN
Status: DISCONTINUED | OUTPATIENT
Start: 2019-12-25 | End: 2019-12-25 | Stop reason: HOSPADM

## 2019-12-25 RX ORDER — MISOPROSTOL 200 UG/1
600 TABLET ORAL
Status: DISCONTINUED | OUTPATIENT
Start: 2019-12-25 | End: 2019-12-26 | Stop reason: HOSPADM

## 2019-12-25 RX ORDER — ONDANSETRON 4 MG/1
4 TABLET, ORALLY DISINTEGRATING ORAL EVERY 6 HOURS PRN
Status: DISCONTINUED | OUTPATIENT
Start: 2019-12-25 | End: 2019-12-26 | Stop reason: HOSPADM

## 2019-12-25 RX ORDER — SODIUM CHLORIDE, SODIUM LACTATE, POTASSIUM CHLORIDE, CALCIUM CHLORIDE 600; 310; 30; 20 MG/100ML; MG/100ML; MG/100ML; MG/100ML
INJECTION, SOLUTION INTRAVENOUS CONTINUOUS
Status: DISCONTINUED | OUTPATIENT
Start: 2019-12-25 | End: 2019-12-25

## 2019-12-25 RX ADMIN — IBUPROFEN 600 MG: 600 TABLET ORAL at 21:13

## 2019-12-25 RX ADMIN — MISOPROSTOL 800 MCG: 200 TABLET ORAL at 09:04

## 2019-12-25 RX ADMIN — FENTANYL CITRATE 100 MCG: 0.05 INJECTION, SOLUTION INTRAMUSCULAR; INTRAVENOUS at 05:52

## 2019-12-25 RX ADMIN — SODIUM CHLORIDE, POTASSIUM CHLORIDE, SODIUM LACTATE AND CALCIUM CHLORIDE: 600; 310; 30; 20 INJECTION, SOLUTION INTRAVENOUS at 04:57

## 2019-12-25 RX ADMIN — METHYLERGONOVINE MALEATE: 0.2 INJECTION, SOLUTION INTRAMUSCULAR; INTRAVENOUS at 11:30

## 2019-12-25 RX ADMIN — SODIUM CHLORIDE, POTASSIUM CHLORIDE, SODIUM LACTATE AND CALCIUM CHLORIDE: 600; 310; 30; 20 INJECTION, SOLUTION INTRAVENOUS at 09:12

## 2019-12-25 RX ADMIN — FENTANYL CITRATE 50 MCG: 0.05 INJECTION, SOLUTION INTRAMUSCULAR; INTRAVENOUS at 11:18

## 2019-12-25 RX ADMIN — OXYTOCIN 125 ML/HR: 10 INJECTION, SOLUTION INTRAMUSCULAR; INTRAVENOUS at 07:51

## 2019-12-25 RX ADMIN — FENTANYL CITRATE 50 MCG: 0.05 INJECTION, SOLUTION INTRAMUSCULAR; INTRAVENOUS at 08:59

## 2019-12-25 RX ADMIN — OXYTOCIN 2 MILLI-UNITS/MIN: 10 INJECTION, SOLUTION INTRAMUSCULAR; INTRAVENOUS at 04:58

## 2019-12-25 RX ADMIN — IBUPROFEN 600 MG: 600 TABLET ORAL at 12:37

## 2019-12-25 RX ADMIN — OXYTOCIN 2000 ML/HR: 10 INJECTION, SOLUTION INTRAMUSCULAR; INTRAVENOUS at 06:33

## 2019-12-25 SDOH — ECONOMIC STABILITY: FOOD INSECURITY: WITHIN THE PAST 12 MONTHS, THE FOOD YOU BOUGHT JUST DIDN'T LAST AND YOU DIDN'T HAVE MONEY TO GET MORE.: PATIENT DECLINED

## 2019-12-25 SDOH — ECONOMIC STABILITY: TRANSPORTATION INSECURITY
IN THE PAST 12 MONTHS, HAS THE LACK OF TRANSPORTATION KEPT YOU FROM MEDICAL APPOINTMENTS OR FROM GETTING MEDICATIONS?: PATIENT DECLINED

## 2019-12-25 SDOH — ECONOMIC STABILITY: FOOD INSECURITY: WITHIN THE PAST 12 MONTHS, YOU WORRIED THAT YOUR FOOD WOULD RUN OUT BEFORE YOU GOT MONEY TO BUY MORE.: PATIENT DECLINED

## 2019-12-25 SDOH — ECONOMIC STABILITY: TRANSPORTATION INSECURITY
IN THE PAST 12 MONTHS, HAS LACK OF TRANSPORTATION KEPT YOU FROM MEETINGS, WORK, OR FROM GETTING THINGS NEEDED FOR DAILY LIVING?: PATIENT DECLINED

## 2019-12-25 ASSESSMENT — LIFESTYLE VARIABLES
EVER_SMOKED: NEVER
ALCOHOL_USE: NO

## 2019-12-25 ASSESSMENT — PATIENT HEALTH QUESTIONNAIRE - PHQ9
SUM OF ALL RESPONSES TO PHQ9 QUESTIONS 1 AND 2: 0
2. FEELING DOWN, DEPRESSED, IRRITABLE, OR HOPELESS: NOT AT ALL
1. LITTLE INTEREST OR PLEASURE IN DOING THINGS: NOT AT ALL

## 2019-12-25 NOTE — L&D DELIVERY NOTE
DATE OF SERVICE:  2019    The patient underwent normal spontaneous vaginal delivery of a viable infant   over intact perineum without epidural placement.  This is a successful trial   of labor after  section.  Spontaneous vaginal delivery of the head in   controlled sterile fashion.  Nuchal cord x1 was noted, but this was delivered   through.  The rest of infant delivered through uncomplicated.  Cord was   clamped x2 and cut and infant was placed on mother's chest.  Spontaneous   vaginal delivery of intact placenta with 3-vessel cord.  No vaginal or   perineal lacerations were noted.  Fundus was firm with Pitocin and massage.    Total estimated blood loss was less than 200 mL.  Apgars of the infant were 8   and 9.  Mother and infant were both stable at the end of delivery.       ____________________________________     Corinne E. Capurro, MD CEC / VIJAYA    DD:  2019 06:55:16  DT:  2019 07:19:22    D#:  [id number]  Job#:  [id number]

## 2019-12-25 NOTE — H&P
DATE OF ADMISSION:  2019    CHIEF COMPLAINT:  Uterine contractions.    HISTORY OF PRESENT ILLNESS:  The patient is a 24-year-old female,  5,   para 2, at 38-1/7th weeks, who presents with complaints of uterine   contractions.  She was found to be 4 cm dilated.  She has a history of   undergoing one prior  section and then one vaginal birth after that.    She has a history of  labor and had a cerclage placed with her last   delivery, which she carried to 37 weeks and delivered.  She had a cerclage   placed with this pregnancy, which I removed approximately 2 weeks ago.    PAST MEDICAL HISTORY:  Negative.    PAST SURGICAL HISTORY:  Includes 2 cerclages and 1 prior  section.    ALLERGIES:  The patient has no known drug allergies.    CURRENT MEDICATIONS:  Include a prenatal vitamin.    LABORATORY DATA:  Show a blood type of O positive, rubella immune.  Her group   B strep culture is negative.    PHYSICAL EXAMINATION:  VITAL SIGNS:  Stable on admission.  CARDIOVASCULAR:  Regular rate and rhythm.  LUNGS:  Clear.  PELVIC:  Vaginal exam on my account is 5 cm, complete, and -1 station.  Fetal   heart tones are 130s and category 1.  Contractions are irregular.    ASSESSMENT AND PLAN:  1.  This is a 24-year-old female,  5, para 2, at 38-1/7th weeks in   active labor.  2.  History of prior  section.  3.  History of vaginal birth after  section, desires vaginal birth   after  section.  4.  History of prior  labor with a cerclage, cerclage placed with this   pregnancy and removed 2 weeks ago.  5.  Class A1 diabetes mellitus, well controlled on diet.  Blood sugar has not   been checked yet.    The patient has been admitted to the hospital.  She does desire to have a   trial of labor and I discussed with her risks of trial of labor to include a   less than 1% risk of uterine rupture, which could result in fetal distress and   require a repeat   section.  She understands these risks and has   signed proper consent forms.  I did break her bag of water with clear fluid   produced.  A blood sugar is currently going to be checked.  A vaginal delivery   is expected.       ____________________________________     Corinne E. Capurro, MD CEC / VIJAYA    DD:  12/25/2019 05:09:00  DT:  12/25/2019 06:19:38    D#:  [id number]  Job#:  [id number]

## 2019-12-25 NOTE — PROGRESS NOTES
0655: Report received from ARNIE Amado RN. POC discussed with patient, all questions and concerns addressed. Breakfast menu provided to patient. Pt states she is having cramping pain rating pain a 5 using a pain scale 0 to 10, pt declines any pain medication. Pt skin to skin with infant at this time.       0845: Pt ambulated to bathroom with steady gait and the stand-by assistance of this RN. Pt voided a large amount, alia care done. Large baseball size clot passed. Pt states she feels dizzy as ambulating back to bed. Pt back to bed, fundus boggy, moderate amount of blood passed, EBL from pad and blood clot 300 mL.     0855: Attempted to place cytotec, pt unable to relax.     0859: 50 mcg of fentanyl given for pain.     0904: 800 mcg of cytotec placed rectally, pt educated.     0922: Dr. Benson updated with patient status and blood loss, will continue to monitor patient.     1115: Pt calls out stating she felt a large gush. Fundus boggy, moderate amount of bleeding noted. Pt declines feeling dizzy. Pads weighted 150 mL blood loss.     1130: Methergine given IM, pt educated, questions and concerns addressed.     1201: Dr. Benson updated with patient status, will continue to monitor patient.     1210: Pt ambulated to bathroom at this time with steady gait, declines dizziness and weakness. Pt voided large amount, alia care done, pad, underwear and ice pack in place. Light bleeding.     1225: Pt transported to postpartum S334 via wheelchair, and infant in arms. Bedside report given to KORIN Jones.

## 2019-12-25 NOTE — CARE PLAN
Problem: Potential for postpartum infection related to presence of episiotomy/vaginal tear and/or uterine contamination  Goal: Patient will be absent from signs and symptoms of infection  Outcome: PROGRESSING AS EXPECTED     Problem: Alteration in comfort related to episiotomy, vaginal repair and/or after birth pains  Goal: Patient is able to ambulate, care for self and infant  Outcome: PROGRESSING AS EXPECTED

## 2019-12-25 NOTE — PROGRESS NOTES
/0015-Pt presents to L&D c/o UC's every 3-5 minutes since 5 pm. Denies/9/ LOF or VB and confirms +FM. Hx cerclage, removed 2 weeks and GDM, diet controlled. TOCO and EFM in place. VS taken. POC discussed  0028-SVE as charted  0032-Phoned Dr. Wade, updated on pt arrival/complaint/status. Orders received to ambulate pt for one hour and then recheck  0143-SVE as charted  0145-Phoned Dr. Wade, updated on SVE, admit orders received  0349-Phoned Dr. Wade, updated on pt status, orders to start pitocin. Provider to see pt at bedside  0358-POC discussed with pt, pt refusing pitocin at this time  0453-Dr. Wade at bedside, AROM clear fluid, pitocin started  0620-Dr. Wade at bedside, SVE 9/100/0  0625-Pt feels urge to push, SVE C/0, Dr. Wade at bedside   0631-VD of viable female infant, apgars 8/9, infant to mother to hold  0655-Report to REN Greenfield RN. POC discussed

## 2019-12-25 NOTE — PROGRESS NOTES
Patient arrived to S334 with infant, SO, and belongings. Oriented patient to call light, emergency cords, unit policies and room. All questions answered.     injoy jhoana given.

## 2019-12-26 VITALS
RESPIRATION RATE: 16 BRPM | SYSTOLIC BLOOD PRESSURE: 115 MMHG | BODY MASS INDEX: 33.97 KG/M2 | WEIGHT: 199 LBS | TEMPERATURE: 97.7 F | HEIGHT: 64 IN | OXYGEN SATURATION: 95 % | HEART RATE: 72 BPM | DIASTOLIC BLOOD PRESSURE: 77 MMHG

## 2019-12-26 PROCEDURE — A9270 NON-COVERED ITEM OR SERVICE: HCPCS | Performed by: OBSTETRICS & GYNECOLOGY

## 2019-12-26 PROCEDURE — 700102 HCHG RX REV CODE 250 W/ 637 OVERRIDE(OP): Performed by: OBSTETRICS & GYNECOLOGY

## 2019-12-26 RX ORDER — PSEUDOEPHEDRINE HCL 30 MG
100 TABLET ORAL 2 TIMES DAILY PRN
Qty: 60 CAP | Refills: 1 | Status: ON HOLD | OUTPATIENT
Start: 2019-12-26 | End: 2021-12-07

## 2019-12-26 RX ORDER — IBUPROFEN 600 MG/1
600 TABLET ORAL EVERY 6 HOURS PRN
Qty: 30 TAB | Refills: 1 | Status: ON HOLD | OUTPATIENT
Start: 2019-12-26 | End: 2021-12-07

## 2019-12-26 RX ADMIN — ACETAMINOPHEN 325 MG: 325 TABLET, FILM COATED ORAL at 07:56

## 2019-12-26 ASSESSMENT — EDINBURGH POSTNATAL DEPRESSION SCALE (EPDS)
I HAVE BEEN ABLE TO LAUGH AND SEE THE FUNNY SIDE OF THINGS: AS MUCH AS I ALWAYS COULD
I HAVE BEEN SO UNHAPPY THAT I HAVE BEEN CRYING: NO, NEVER
I HAVE BEEN SO UNHAPPY THAT I HAVE HAD DIFFICULTY SLEEPING: NOT AT ALL
I HAVE FELT SAD OR MISERABLE: NO, NOT AT ALL
I HAVE LOOKED FORWARD WITH ENJOYMENT TO THINGS: RATHER LESS THAN I USED TO
THINGS HAVE BEEN GETTING ON TOP OF ME: NO, I HAVE BEEN COPING AS WELL AS EVER
THE THOUGHT OF HARMING MYSELF HAS OCCURRED TO ME: NEVER
I HAVE BLAMED MYSELF UNNECESSARILY WHEN THINGS WENT WRONG: NOT VERY OFTEN
I HAVE BEEN ANXIOUS OR WORRIED FOR NO GOOD REASON: HARDLY EVER
I HAVE FELT SCARED OR PANICKY FOR NO GOOD REASON: NO, NOT AT ALL

## 2019-12-26 NOTE — DISCHARGE SUMMARY
Discharge Summary:      Tricia Rees    Admit Date:   2019  Discharge Date:  2019     Admitting diagnosis:  Pregnancy  Labor and delivery indication for care or intervention  Prior  section with prior successful   Hx of  delivery, s/p cerclage in this pregnancy  Discharge Diagnosis: Status post vaginal, spontaneous, after   Pregnancy Complications: hx of prior  delivery with cerclage placement in this pregnancy  Tubal Ligation:  no        History:  Past Medical History:   Diagnosis Date   •  treated with cerclage at 14 weeks gestation 2012   • Gestational diabetes    • HBsAg (hepatitis B surface antigen) positive, currently pregnant ([facility]) 2012   • Incompetent cervix in pregnancy 2012   • Powell cerclage present 2012     OB History    Para Term  AB Living   5 3 3   1 3   SAB TAB Ectopic Molar Multiple Live Births   1       0 3      # Outcome Date GA Lbr Keven/2nd Weight Sex Delivery Anes PTL Lv   5 Term 19 38w1d 04:42 / 00:06 2.745 kg (6 lb 0.8 oz) F  None N BEST   4 Term 12 37w0d  3.685 kg (8 lb 2 oz) M CS-LTranv Spinal  BEST      Birth Comments: unable to push.   3 SAB  18w0d   M SAB None        Birth Comments: probable incompetent cervix, PPROM and was found to be 4 cm.     2 Term         BEST   1                  Patient has no known allergies.  Patient Active Problem List    Diagnosis Date Noted   • Supervision of other high-risk pregnancy 2012   • HBsAg (hepatitis B surface antigen) positive, currently pregnant ([facility]) 2012   •  treated with cerclage at 14 weeks gestation 2012   • Powell cerclage present 2012        Hospital Course:   24 y.o. , now para 3, was admitted with the above mentioned diagnosis, underwent Active Labor, successful . Patient postpartum course was unremarkable, with progressive advancement in diet , ambulation and toleration of oral  analgesia. Patient without complaints today and desires discharge.      Vitals:    12/25/19 1440 12/25/19 2200 12/26/19 0220 12/26/19 0600   BP: 112/72 112/68 115/75 123/85   Pulse: 81 81 67 69   Resp: 18 17 17 17   Temp: 37.3 °C (99.1 °F) 36.5 °C (97.7 °F) 36.1 °C (97 °F) 36.1 °C (97 °F)   TempSrc: Temporal Temporal Temporal Temporal   SpO2: 97% 97% 98% 94%   Weight:       Height:           Current Facility-Administered Medications   Medication Dose   • ondansetron (ZOFRAN ODT) dispertab 4 mg  4 mg    Or   • ondansetron (ZOFRAN) syringe/vial injection 4 mg  4 mg   • oxytocin (PITOCIN) infusion (for postpartum)   mL/hr   • ibuprofen (MOTRIN) tablet 600 mg  600 mg   • acetaminophen (TYLENOL) tablet 325 mg  325 mg   • oxyCODONE-acetaminophen (PERCOCET) 5-325 MG per tablet 1 Tab  1 Tab   • oxyCODONE-acetaminophen (PERCOCET) 5-325 MG per tablet 2 Tab  2 Tab   • oxytocin (PITOCIN) 20 UNITS/1000ML LR (induction of labor)  0.5-20 dulce maria-units/min   • LR infusion     • miSOPROStol (CYTOTEC) tablet 600 mcg  600 mcg   • docusate sodium (COLACE) capsule 100 mg  100 mg       Exam:  Gen: NAD, AAO  Abdomen: Abdomen soft, non-tender. No masses,  No organomegally, FF @ U-2. No rebound/guarding.  Fundus Tender: No  Incision: none  Extremity: trace edema, no redness or tenderness in the calves or thighs, 2+DPP, Homans sign is negative, no sign of DVT     Labs:  Recent Labs     12/25/19  0230 12/25/19  1508   WBC 13.8* 15.6*   RBC 4.29 4.14*   HEMOGLOBIN 11.8* 11.5*   HEMATOCRIT 37.6 34.6*   MCV 87.6 83.6   MCH 27.5 27.8   MCHC 31.4* 33.2*   RDW 52.0* 48.3   PLATELETCT 239 206   MPV 11.3 11.5        Activity:   Discharge to home  Pelvic Rest x 6 weeks    Assessment:  normal postpartum course  Discharge Assessment: Taking adequate diet and fluids     Follow up: 6wk with Corinne E Capurro, M.D.       Discharge Meds:   Current Outpatient Medications   Medication Sig Dispense Refill   • docusate sodium 100 MG Cap Take 100 mg by mouth  2 times a day as needed for Constipation. 60 Cap 1   • ibuprofen (MOTRIN) 600 MG Tab Take 1 Tab by mouth every 6 hours as needed (For cramping after delivery; do not give if patient is receiving ketorolac (Toradol)). 30 Tab 1       Ady Benson M.D.

## 2019-12-26 NOTE — CARE PLAN
Problem: Altered physiologic condition related to immediate post-delivery state and potential for bleeding/hemorrhage  Goal: Patient physiologically stable as evidenced by normal lochia, palpable uterine involution and vital signs within normal limits  12/26/2019 0257 by Terri Chapman R.N.  Outcome: PROGRESSING AS EXPECTED  Note:   Fundus firm, light lochia noted. Patient able to provide self perineal care. Patient educated to notify RN if patient is soaking a pad within an hour and passing golf-sized clots, patient verbalize understanding.   12/26/2019 0213 by Terri Chapman R.N.  Outcome: PROGRESSING AS EXPECTED  Note:   Fundus firm, light lochia noted. Patient educated      Problem: Potential for postpartum infection related to presence of episiotomy/vaginal tear and/or uterine contamination  Goal: Patient will be absent from signs and symptoms of infection  Outcome: PROGRESSING AS EXPECTED  Note:   Monitoring patient vital signs and lab values. Patient exhibits no s/s of infection.

## 2019-12-26 NOTE — LACTATION NOTE
Initial visit. MOB reports baby has been feeding on cue; she reports she  2 other children and had a plentiful milk production. MOB is concerned about baby's latch and baby tucking her bottom lip under. MOB nipples intact. Baby asleep in bassinette with pacifier at this time. Reviewed pacifier use in first 4 weeks. Baby unwrapped at MOB suggestion and placed skin to skin with MOB, latches deeply with lower lip tucked, cheeks dimpling. MOB removes baby from breast and relatches. MOB taught how to flange lower lip after baby latches if necessary. Reviewed feeding frequency norms for first 5 days and 6-8 weeks, MOB encouraged to feed on cue but at least 8 times every 24 hours, offering both breasts every feeding. Discussed normal nighttime behaviors and cluster feeding behaviors. Reviewed post d/c followup resources, and encouraged bfdg. group attendance for ongoing support. JESSICA reports she is a client of [facility].

## 2019-12-26 NOTE — DISCHARGE PLANNING
Discharge Planning Assessment Post Partum     Reason for Referral: History of depression and anxiety  Address: [facility] [place] [address]  Phone: [phone]  Type of Living Situation: living with FOB and children  Mom Diagnosis: Pregnancy  Baby Diagnosis: [place]  Primary Language: English     Father of the Baby: Ady Hazel  Involved in baby’s care? Yes  Contact Information: [phone]     Prenatal Care: Yes  Mom's PCP: Aguilar Roche, PAC  PCP for new baby: Dr. Shirley     Support System: FO  Coping/Bonding between mother & baby: Yes  Source of Feeding: breast feeding  Supplies for Infant: prepared for infant     Mom's Insurance: [employer]  Baby Covered on Insurance:Yes  Other children in the home/names & ages: Two daughters; ages 5 and 3.  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  [facility]B [facility]  Domestic Violence History: No  Drug/ETOH History: No     Resources Provided: Offered resources but MOB declined and stated they had everything they needed  Referrals Made: none      Clearance for Discharge: Infant is cleared to discharge home with parents

## 2019-12-26 NOTE — DISCHARGE INSTRUCTIONS
POSTPARTUM DISCHARGE INSTRUCTIONS FOR MOM    YOB: 1995   Age: 24 y.o.               Admit Date: 12/25/2019     Discharge Date: 12/26/2019  Attending Doctor:  Corinne E Capurro, M.D.                  Allergies:  Patient has no known allergies.    Discharged to home by car. Discharged via wheelchair, hospital escort: Yes.  Special equipment needed: Not Applicable  Belongings with: Personal  Be sure to schedule a follow-up appointment with your primary care doctor or any specialists as instructed.     Discharge Plan:   Diet Plan: Discussed  Activity Level: Discussed  Confirmed Follow up Appointment: Patient to Call and Schedule Appointment  Confirmed Symptoms Management: Discussed  Medication Reconciliation Updated: Yes  Influenza Vaccine Indication: Patient Refuses    REASONS TO CALL YOUR OBSTETRICIAN:    1.   Persistent fever or shaking chills (Temperature higher than 100.4)  2.   Heavy bleeding (soaking more than 1 pad per hour); Passing clots  3.   Foul odor from vagina  4.   Mastitis (Breast infection; breast pain, chills, fever, redness)  5.   Urinary pain, burning or frequency  6.   Episiotomy infection  7.   Abdominal incision infection  8.   Severe depression longer than 24 hours      HAND WASHING  · Prior to handling the baby.  · Before breastfeeding or bottle feeding baby.  · After using the bathroom.    WOUND CARE.  · Episiotomy/Laceration:    Use tucks or Dermoplast spray, Sitz Bath as needed (6 inches of warm water), continue to use alia bottle until bleeding stops.    BREAST CARE:  · Wear supportive bra.  · No soap on breast or nipples if breastfeeding.  · Apply lanolin for sore cracked nipples.  Do NOT wash lanolin off prior to breastfeeding.    VAGINAL CARE  · Nothing inside vagina for 6 weeks: NO sex, No douching, No tampons.  · Bleeding may continue for 2-4 weeks.  Amount may vary.  Call your OB doctor for heavy bleeding which means soaking more than 1 pad per hour.    BIRTH  "CONTROL  · It is possible to become pregnant at any time after delivery and while breastfeeding.  · Plan to discuss with your doctor a method of birth control at your 6 week visit.    DIET AND ELIMINATION  · Eating more fiber (bran cereal, fruits, and vegetables) AND drinking lots of fluids will help to avoid constipation.  · Urinary frequency after childbirth is normal.    POSTPARTUM BLUES  During the first few days after birth, you may experience a sense of the \"blues\" including:  · Impatience, irritability, or crying    These feelings come and go quickly.  However, as many as one in ten women experience emotional symptoms known a postpartum depression.    Postpartum depression:  May start as early as the second or third day after delivery or take several weeks or months to develop.  Symptoms of \"blues\" are present, but are more intense.    ·        Loss of appetite  ·        Crying spells  ·        Feelings of hopelessness or loss of control  ·        Over concern or no concern at all about the baby  ·        Fear of touching the baby  ·        Little or no concern about your own appearance  ·        Inability to sleep or need of excessive sleep    Any depression over 24 hours should be reported to your obstetrician.      Crisis Hotline:  [place] Crisis Hotline:  2-522-RKFMBOV  Or 1-505.437.5318  [place] Crisis Hotline:  1-386.220.6794  Or 888-834-5874      Quit Smoking / Tobacco Use:    I understand the use of any tobacco products increases my chance of suffering from future heart disease and could cause other illnesses which may shorten my life. Quitting the use of tobacco products is the single most important thing I can do to improve my health. For further information on smoking / tobacco cessation call a Toll Free Quit Line at 1-265.747.5321 (*[employer]) or 1-440.302.1132 (American [employer] Association) or you can access the web based program at www.lungusa.org.    [place] Tobacco Users Help Line: "  (370) 326-6170       Toll Free: 1-452.500.9380  ADDITIONAL EDUCATIONAL MATERIALS GIVEN TO PATIENT:      My signature on this form indicates that:    1.  I have reviewed and understand the above information  2.  My questions regarding this information have been answered to my satisfaction.  3.  I have formulated a plan with my discharge nurse to obtain my prescribed medication for home.

## 2019-12-26 NOTE — DISCHARGE PLANNING
:     Notified MOB was asking for a work excuse letter for FOB.  FOB is Aldo Long.  Provided MOB with a letter.     Nothing further at this time.

## 2019-12-26 NOTE — PROGRESS NOTES
"Report received from Mable LANGLEY. Assumed Patient care. Patient appears calm and in no acute distress. Labs and orders reviewed. Assessment completed. Fundus firm, light lochia noted. Patient states 6/10 abdomen;perineum pain, patient requesting PRN pain medication refer to MAR. Patient educated to notify RN if pain is worsen, patient verbalizes understanding. Patient educated on keeping infant bundled up and/or skin-to-skin, safe sleep policy for infant, and infant feeding frequency, patient verbalizes understanding. Plan of care discussed with patient; questions have been answered at this time. Patient needs have been met at this time. Patient encouraged to call when needing assistance. Call light within reach. Rounding in place. /72   Pulse 81   Temp 37.3 °C (99.1 °F) (Temporal)   Resp 18   Ht 1.626 m (5' 4\")   Wt 90.3 kg (199 lb)   LMP 04/02/2019   SpO2 97%   Breastfeeding? Yes   BMI 34.16 kg/m² .   "

## 2020-10-20 ENCOUNTER — HOSPITAL ENCOUNTER (EMERGENCY)
Facility: MEDICAL CENTER | Age: 25
End: 2020-10-20
Attending: EMERGENCY MEDICINE
Payer: MEDICAID

## 2020-10-20 ENCOUNTER — APPOINTMENT (OUTPATIENT)
Dept: RADIOLOGY | Facility: MEDICAL CENTER | Age: 25
End: 2020-10-20
Attending: EMERGENCY MEDICINE
Payer: MEDICAID

## 2020-10-20 VITALS
SYSTOLIC BLOOD PRESSURE: 116 MMHG | WEIGHT: 162.7 LBS | HEIGHT: 65 IN | DIASTOLIC BLOOD PRESSURE: 70 MMHG | HEART RATE: 80 BPM | OXYGEN SATURATION: 99 % | TEMPERATURE: 97 F | RESPIRATION RATE: 16 BRPM | BODY MASS INDEX: 27.11 KG/M2

## 2020-10-20 DIAGNOSIS — R10.30 LOWER ABDOMINAL PAIN: ICD-10-CM

## 2020-10-20 LAB
APPEARANCE UR: CLEAR
BACTERIA GENITAL QL WET PREP: NORMAL
BILIRUB UR QL STRIP.AUTO: NEGATIVE
COLOR UR: YELLOW
GLUCOSE UR STRIP.AUTO-MCNC: NEGATIVE MG/DL
HCG UR QL: NEGATIVE
KETONES UR STRIP.AUTO-MCNC: NEGATIVE MG/DL
LEUKOCYTE ESTERASE UR QL STRIP.AUTO: NEGATIVE
MICRO URNS: NORMAL
NITRITE UR QL STRIP.AUTO: NEGATIVE
PH UR STRIP.AUTO: 5.5 [PH] (ref 5–8)
PROT UR QL STRIP: NEGATIVE MG/DL
RBC UR QL AUTO: NEGATIVE
SIGNIFICANT IND 70042: NORMAL
SITE SITE: NORMAL
SOURCE SOURCE: NORMAL
SP GR UR REFRACTOMETRY: 1.02

## 2020-10-20 PROCEDURE — 700102 HCHG RX REV CODE 250 W/ 637 OVERRIDE(OP): Performed by: EMERGENCY MEDICINE

## 2020-10-20 PROCEDURE — 87591 N.GONORRHOEAE DNA AMP PROB: CPT

## 2020-10-20 PROCEDURE — 81025 URINE PREGNANCY TEST: CPT

## 2020-10-20 PROCEDURE — 87491 CHLMYD TRACH DNA AMP PROBE: CPT

## 2020-10-20 PROCEDURE — A9270 NON-COVERED ITEM OR SERVICE: HCPCS | Performed by: EMERGENCY MEDICINE

## 2020-10-20 PROCEDURE — 76856 US EXAM PELVIC COMPLETE: CPT

## 2020-10-20 PROCEDURE — 99284 EMERGENCY DEPT VISIT MOD MDM: CPT

## 2020-10-20 PROCEDURE — 81003 URINALYSIS AUTO W/O SCOPE: CPT

## 2020-10-20 RX ORDER — ACETAMINOPHEN 325 MG/1
650 TABLET ORAL ONCE
Status: COMPLETED | OUTPATIENT
Start: 2020-10-20 | End: 2020-10-20

## 2020-10-20 RX ADMIN — ACETAMINOPHEN 650 MG: 325 TABLET, FILM COATED ORAL at 11:50

## 2020-10-20 ASSESSMENT — FIBROSIS 4 INDEX: FIB4 SCORE: 0.61

## 2020-10-20 ASSESSMENT — PAIN DESCRIPTION - DESCRIPTORS: DESCRIPTORS: CRAMPING

## 2020-10-20 NOTE — DISCHARGE INSTRUCTIONS
THE EXACT CAUSE OF YOUR PAIN IS UNCLEAR--THIS MIGHT BE EARLY IN THE DISEASE PROCESS AND WE ARE UNABLE TO IDENTIFY IT AT THIS TIME (SUCH AS EARLY APPENDICITIS, FOR EXAMPLE).      RETURN TO ER IN 8-12 HRS UNLESS YOU ARE FEELING COMPLETELY BETTER.    RETURN SOONER FOR PAIN, VOMITING NOT CONTROLLED BY MEDICATIONS, FEVER, ANY OTHER CONCERN.    CLEAR LIQUIDS FOR NEXT 12 HOURS.  ADVANCE DIET AS TOLERATED.

## 2020-10-20 NOTE — ED NOTES
Per pt, she has to pick her daughter up from school and could not wait for US result. ERP aware.  Pt requested a call back with results. Pt signed AMA and left ER in a stable condition.

## 2020-10-20 NOTE — ED PROVIDER NOTES
ED Provider Note    CHIEF COMPLAINT  Chief Complaint   Patient presents with   • Abdominal Pain     lower abd pain with nausea since saturday.        HPI  Tricia Rees is a 25 y.o. G4, P3 Ab1 female who presents complaining of abdominal pain.  Patient presumes she has a UTI.  She has been taking Azo without relief.    Patient states she has suprapubic pressure/discomfort since Saturday/for 3 days.  This was gradual in onset.  It is mild to moderate.  It has been constant.  No aggravating or alleviating factors.  She also reports bilateral lower back pressure.  She admits to nausea.  She denies weakness, numbness, trauma, fever, chills, diarrhea, vomiting, dysuria, hematuria, vaginal discharge.    Patient states she had an ovarian cyst 5 years ago.    Patient requests Tylenol for pain.      ALLERGIES  No Known Allergies    CURRENT MEDICATIONS  Denies    PAST MEDICAL HISTORY   has a past medical history of  treated with cerclage at 14 weeks gestation (1/12/2012), Gestational diabetes, HBsAg (hepatitis B surface antigen) positive, currently pregnant (HCC) (1/18/2012), Incompetent cervix in pregnancy (1/12/2012), and Powell cerclage present (1/12/2012).    SURGICAL HISTORY   has a past surgical history that includes cervical cerclage (  10/2011); cervical cerclage (3/24/2012); primary c section (3/25/2012); and cervical cerclage (7/12/2019).    SOCIAL HISTORY  Social History     Tobacco Use   • Smoking status: Never Smoker   • Smokeless tobacco: Never Used   Substance and Sexual Activity   • Alcohol use: Not Currently     Comment: occassionally   • Drug use: Not Currently     Types: Inhaled     Comment: THC for menstral pains   • Sexual activity: Not Currently     Partners: Male     Comment: unplanned pregnancy FOB involved       REVIEW OF SYSTEMS  See HPI for further details.  All other systems are negative except as above in HPI.    PHYSICAL EXAM  VITAL SIGNS: /70   Pulse 80   Temp 36.1 °C (97 °F)  "(Temporal)   Resp 16   Ht 1.651 m (5' 5\")   Wt 73.8 kg (162 lb 11.2 oz)   LMP 10/06/2020   SpO2 99%   BMI 27.07 kg/m²     General:  WDWN, nontoxic appearing in NAD; A+Ox3; V/S as above; afebrile  Skin: warm and dry; good color; no rash  HEENT: NCAT; EOMs intact; PERRL; no scleral icterus   Neck: FROM; soft  Cardiovascular: Regular heart rate and rhythm.  No murmurs, rubs, or gallops; pulses 2+ bilaterally radially and DP areas  Lungs: Clear to auscultation with good air movement bilaterally.  No wheezes, rhonchi, or rales.   Abdomen: BS present; soft; NTND; no rebound, guarding, or rigidity.  No organomegaly or pulsatile mass; no CVAT   Pelvic: (Performed with female RN, Kaye) normal external female genitalia; moderate amount of whitish-yellow discharge in the vault; no cervical motion tenderness, no adnexal masses  Back: No mottling, no reproducible midline tenderness or step-offs  Extremities: DOVE x 4; no e/o trauma; no pedal edema; neg Megan's  Neurologic: CNs III-XII grossly intact; speech clear; distal sensation intact; strength 5/5 UE/LEs  Psychiatric: Appropriate affect, normal mood    LABS  Results for orders placed or performed during the hospital encounter of 10/20/20   URINALYSIS,CULTURE IF INDICATED    Specimen: Urine   Result Value Ref Range    Color Yellow     Character Clear     Ph 5.5 5.0 - 8.0    Glucose Negative Negative mg/dL    Ketones Negative Negative mg/dL    Protein Negative Negative mg/dL    Bilirubin Negative Negative    Nitrite Negative Negative    Leukocyte Esterase Negative Negative    Occult Blood Negative Negative    Micro Urine Req see below    REFRACTOMETER SG   Result Value Ref Range    Specific Gravity 1.025    WET PREP    Specimen: Vaginal; Genital   Result Value Ref Range    Significant Indicator NEG     Source GEN     Site VAGINAL     Wet Prep For Parasites       No yeast.  No motile Trichomonas seen.  No clue cells seen.  Moderate WBCs seen.     CHLAMYDIA & GC BY PCR    " Specimen: Genital   Result Value Ref Range    Source Vaginal    BETA-HCG QUALITATIVE URINE   Result Value Ref Range    Beta-Hcg Urine Negative Negative           IMAGING  US-PELVIC COMPLETE (TRANSABDOMINAL/TRANSVAGINAL) (COMBO)   Final Result      No ovarian torsion or enlarged cyst       section scar      Trace free fluid is nonspecific and within normal physiologic limits        MEDICAL RECORD  I have reviewed patient's medical record and pertinent results are listed below.      COURSE & MEDICAL DECISION MAKING  I have reviewed any medical record information, laboratory studies and radiographic results as noted.    Tricia Rees is a 25 y.o. female who presents complaining of suprapubic pressure and mid lower back pain.  Patient is afebrile and has a soft, nontender, nonsurgical abdomen.  The patient suspected a UTI.  Urinalysis is negative for this.  We will send a urine culture.  Differential diagnosis includes ovarian cyst, TOA, PID, cervicitis, pregnancy.    Appropriate PPE was worn at all times while interacting with the patient, including goggles, N95 mask, and surgical mask.    Pelvic exam reveals no obvious concern for cervicitis or PID.    12:55 PM  I was advised by the RN taking care of the nurse that the patient needs to leave the ER to  her child.  She requested that we call her with the ultrasound results.  The patient was asked to sign out AMA.    1:31 PM  I contacted the patient via her mobile phone.  I advised her of her pelvic ultrasound results.  I gave her abdominal pain return precautions which she understood.  I advised her that this may be early appendicitis or some other surgical process.  She will follow up with her GYN.  Urine culture was ordered.  GC/Chlamydia are pending as well.    FINAL IMPRESSION  AMA  1. Lower abdominal pain         Electronically signed by: Josiane Lares M.D., 10/20/2020 10:37 AM

## 2020-10-20 NOTE — ED NOTES
ERP at bedside. Pt agrees with plan of care discussed by ERP. AIDET acknowledged with patient. Medicinal interventions carried out per ERP orders. [place] in low position, side rail up for pt safety. Call light within reach. Will continue to monitor.

## 2020-10-20 NOTE — ED TRIAGE NOTES
"Chief Complaint   Patient presents with   • Abdominal Pain     lower abd pain with nausea since saturday.      /70   Pulse 80   Temp 36.1 °C (97 °F) (Temporal)   Resp 16   Ht 1.651 m (5' 5\")   Wt 73.8 kg (162 lb 11.2 oz)   LMP 10/06/2020   SpO2 99%   BMI 27.07 kg/m²     "

## 2020-10-21 LAB
C TRACH DNA SPEC QL NAA+PROBE: NEGATIVE
N GONORRHOEA DNA SPEC QL NAA+PROBE: NEGATIVE
SPECIMEN SOURCE: NORMAL

## 2020-12-12 ENCOUNTER — HOSPITAL ENCOUNTER (EMERGENCY)
Dept: HOSPITAL 8 - ED | Age: 25
Discharge: HOME | End: 2020-12-12
Payer: MEDICAID

## 2020-12-12 VITALS — HEIGHT: 64 IN | WEIGHT: 160.94 LBS | BODY MASS INDEX: 27.48 KG/M2

## 2020-12-12 VITALS — DIASTOLIC BLOOD PRESSURE: 74 MMHG | SYSTOLIC BLOOD PRESSURE: 125 MMHG
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
## 2020-12-12 DIAGNOSIS — R10.2: ICD-10-CM
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
## 2020-12-12 DIAGNOSIS — S70.01XA: ICD-10-CM

## 2020-12-12 DIAGNOSIS — S33.5XXA: Primary | ICD-10-CM

## 2020-12-12 DIAGNOSIS — R51.9: ICD-10-CM

## 2020-12-12 PROCEDURE — 72110 X-RAY EXAM L-2 SPINE 4/>VWS: CPT

## 2020-12-12 PROCEDURE — 72190 X-RAY EXAM OF PELVIS: CPT

## 2020-12-12 PROCEDURE — 99284 EMERGENCY DEPT VISIT MOD MDM: CPT

## 2021-04-24 ENCOUNTER — HOSPITAL ENCOUNTER (EMERGENCY)
Facility: MEDICAL CENTER | Age: 26
End: 2021-04-24
Payer: MEDICAID

## 2021-04-24 VITALS
TEMPERATURE: 97.8 F | DIASTOLIC BLOOD PRESSURE: 82 MMHG | HEIGHT: 64 IN | WEIGHT: 169.75 LBS | OXYGEN SATURATION: 100 % | HEART RATE: 77 BPM | BODY MASS INDEX: 28.98 KG/M2 | RESPIRATION RATE: 18 BRPM | SYSTOLIC BLOOD PRESSURE: 126 MMHG

## 2021-04-24 PROCEDURE — 302449 STATCHG TRIAGE ONLY (STATISTIC)

## 2021-04-24 ASSESSMENT — FIBROSIS 4 INDEX: FIB4 SCORE: 0.61

## 2021-04-24 NOTE — ED TRIAGE NOTES
"Pt presents enquiring regarding her Nexplanon implant.  She has decided to leave our department and refer to her OB-GYN fur further questions.  Chief Complaint   Patient presents with   • Other     /82   Pulse 77   Temp 36.6 °C (97.8 °F) (Temporal)   Resp 18   Ht 1.626 m (5' 4\")   Wt 77 kg (169 lb 12.1 oz)   LMP 03/23/2021 (Exact Date)   SpO2 100%   BMI 29.14 kg/m²       "

## 2021-07-03 ENCOUNTER — HOSPITAL ENCOUNTER (OUTPATIENT)
Dept: HOSPITAL 8 - LDOP | Age: 26
Setting detail: OBSERVATION
Discharge: HOME | End: 2021-07-03
Attending: OBSTETRICS & GYNECOLOGY | Admitting: OBSTETRICS & GYNECOLOGY
Payer: MEDICAID

## 2021-07-03 VITALS — DIASTOLIC BLOOD PRESSURE: 66 MMHG | SYSTOLIC BLOOD PRESSURE: 110 MMHG

## 2021-07-03 VITALS — HEIGHT: 64 IN | WEIGHT: 182.32 LBS | BODY MASS INDEX: 31.13 KG/M2

## 2021-07-03 DIAGNOSIS — O34.32: Primary | ICD-10-CM

## 2021-07-03 DIAGNOSIS — F12.90: ICD-10-CM

## 2021-07-03 DIAGNOSIS — Z3A.14: ICD-10-CM

## 2021-07-03 DIAGNOSIS — Z20.822: ICD-10-CM

## 2021-07-03 DIAGNOSIS — Z79.899: ICD-10-CM

## 2021-07-03 DIAGNOSIS — O99.322: ICD-10-CM

## 2021-07-03 LAB
BASOPHILS # BLD AUTO: 0 X10^3/UL (ref 0–0.1)
BASOPHILS NFR BLD AUTO: 0 % (ref 0–1)
EOSINOPHIL # BLD AUTO: 0.1 X10^3/UL (ref 0–0.4)
EOSINOPHIL NFR BLD AUTO: 1 % (ref 1–7)
ERYTHROCYTE [DISTWIDTH] IN BLOOD BY AUTOMATED COUNT: 18.8 % (ref 9.6–15.2)
LYMPHOCYTES # BLD AUTO: 2.3 X10^3/UL (ref 1–3.4)
LYMPHOCYTES NFR BLD AUTO: 22 % (ref 22–44)
MCH RBC QN AUTO: 27.8 PG (ref 27–34.8)
MCHC RBC AUTO-ENTMCNC: 33.5 G/DL (ref 32.4–35.8)
MONOCYTES # BLD AUTO: 0.6 X10^3/UL (ref 0.2–0.8)
MONOCYTES NFR BLD AUTO: 6 % (ref 2–9)
NEUTROPHILS # BLD AUTO: 7.5 X10^3/UL (ref 1.8–6.8)
NEUTROPHILS NFR BLD AUTO: 71 % (ref 42–75)
PLATELET # BLD AUTO: 229 X10^3/UL (ref 130–400)
PMV BLD AUTO: 9.4 FL (ref 7.4–10.4)
RBC # BLD AUTO: 4.53 X10^6/UL (ref 3.82–5.3)

## 2021-07-03 PROCEDURE — 36415 COLL VENOUS BLD VENIPUNCTURE: CPT

## 2021-07-03 PROCEDURE — G0378 HOSPITAL OBSERVATION PER HR: HCPCS

## 2021-07-03 PROCEDURE — 59320 REVISION OF CERVIX: CPT

## 2021-07-03 PROCEDURE — 96374 THER/PROPH/DIAG INJ IV PUSH: CPT

## 2021-07-03 PROCEDURE — 96361 HYDRATE IV INFUSION ADD-ON: CPT

## 2021-07-03 PROCEDURE — 80307 DRUG TEST PRSMV CHEM ANLYZR: CPT

## 2021-07-03 PROCEDURE — 85025 COMPLETE CBC W/AUTO DIFF WBC: CPT

## 2021-07-03 PROCEDURE — 87635 SARS-COV-2 COVID-19 AMP PRB: CPT

## 2021-07-03 RX ADMIN — SODIUM CHLORIDE, SODIUM LACTATE, POTASSIUM CHLORIDE, AND CALCIUM CHLORIDE SCH MLS/HR: .6; .31; .03; .02 INJECTION, SOLUTION INTRAVENOUS at 10:41

## 2021-07-03 RX ADMIN — SODIUM CHLORIDE, SODIUM LACTATE, POTASSIUM CHLORIDE, AND CALCIUM CHLORIDE SCH MLS/HR: .6; .31; .03; .02 INJECTION, SOLUTION INTRAVENOUS at 13:23

## 2021-11-14 ENCOUNTER — HOSPITAL ENCOUNTER (EMERGENCY)
Facility: MEDICAL CENTER | Age: 26
End: 2021-11-14
Attending: OBSTETRICS & GYNECOLOGY | Admitting: OBSTETRICS & GYNECOLOGY
Payer: MEDICAID

## 2021-11-14 VITALS
WEIGHT: 203 LBS | HEART RATE: 101 BPM | RESPIRATION RATE: 18 BRPM | SYSTOLIC BLOOD PRESSURE: 119 MMHG | BODY MASS INDEX: 33.82 KG/M2 | HEIGHT: 65 IN | DIASTOLIC BLOOD PRESSURE: 76 MMHG | TEMPERATURE: 96.7 F

## 2021-11-14 LAB — A1 MICROGLOB PLACENTAL VAG QL: NEGATIVE

## 2021-11-14 PROCEDURE — 84112 EVAL AMNIOTIC FLUID PROTEIN: CPT

## 2021-11-14 PROCEDURE — 59025 FETAL NON-STRESS TEST: CPT

## 2021-11-14 PROCEDURE — 302449 STATCHG TRIAGE ONLY (STATISTIC)

## 2021-11-14 NOTE — PROGRESS NOTES
" [facility] -  EGA - 33.6  Pt has a Cerclage in place for incompetent cervix    1344 - Pt arrived to labor and delivery for Cramping and \"jo awad\" all day q 15mins as well as some LOF since 0700 Pt unsure if she peed herself or if her water is broken. Pt placed in room LDA 1. External monitors in place X2. Category I FHT at this time. VSS. Pt reports good FM. No complaints of contractions, ROM or vaginal bleeding. POC discussed with pt all questions answered.   1355 Dr. Fields notified of pt status, orders for amnisure.   1430 Amnisure collected via SSE, no gross pooling noted. Vaginal discharge noted in vaginal vault.  1510 Dr. Fields notified of negative amnisure, orders to d/c home.  1520 Discharge instructions given including  PTL precautions, and when to return to the hospital, Pt verbalizes understanding at this time. All questions answered.  1525 Pt ambulated off the unit with personal belongings in place.    "

## 2021-12-01 LAB — GP B STREP DNA SPEC QL NAA+PROBE: NEGATIVE

## 2021-12-05 ENCOUNTER — HOSPITAL ENCOUNTER (INPATIENT)
Facility: MEDICAL CENTER | Age: 26
LOS: 2 days | End: 2021-12-07
Attending: OBSTETRICS & GYNECOLOGY | Admitting: OBSTETRICS & GYNECOLOGY
Payer: MEDICAID

## 2021-12-05 ENCOUNTER — ANESTHESIA EVENT (OUTPATIENT)
Dept: ANESTHESIOLOGY | Facility: MEDICAL CENTER | Age: 26
End: 2021-12-05
Payer: MEDICAID

## 2021-12-05 ENCOUNTER — ANESTHESIA (OUTPATIENT)
Dept: ANESTHESIOLOGY | Facility: MEDICAL CENTER | Age: 26
End: 2021-12-05
Payer: MEDICAID

## 2021-12-05 DIAGNOSIS — G89.18 PAIN FOLLOWING SURGERY OR PROCEDURE: ICD-10-CM

## 2021-12-05 LAB
BASOPHILS # BLD AUTO: 0.4 % (ref 0–1.8)
BASOPHILS # BLD: 0.05 K/UL (ref 0–0.12)
EOSINOPHIL # BLD AUTO: 0.11 K/UL (ref 0–0.51)
EOSINOPHIL NFR BLD: 0.9 % (ref 0–6.9)
ERYTHROCYTE [DISTWIDTH] IN BLOOD BY AUTOMATED COUNT: 47.1 FL (ref 35.9–50)
HCT VFR BLD AUTO: 34.4 % (ref 37–47)
HGB BLD-MCNC: 11.2 G/DL (ref 12–16)
HOLDING TUBE BB 8507: NORMAL
IMM GRANULOCYTES # BLD AUTO: 0.18 K/UL (ref 0–0.11)
IMM GRANULOCYTES NFR BLD AUTO: 1.5 % (ref 0–0.9)
LYMPHOCYTES # BLD AUTO: 1.98 K/UL (ref 1–4.8)
LYMPHOCYTES NFR BLD: 16.5 % (ref 22–41)
MCH RBC QN AUTO: 26 PG (ref 27–33)
MCHC RBC AUTO-ENTMCNC: 32.6 G/DL (ref 33.6–35)
MCV RBC AUTO: 79.8 FL (ref 81.4–97.8)
MONOCYTES # BLD AUTO: 0.98 K/UL (ref 0–0.85)
MONOCYTES NFR BLD AUTO: 8.2 % (ref 0–13.4)
NEUTROPHILS # BLD AUTO: 8.68 K/UL (ref 2–7.15)
NEUTROPHILS NFR BLD: 72.5 % (ref 44–72)
NRBC # BLD AUTO: 0 K/UL
NRBC BLD-RTO: 0 /100 WBC
PLATELET # BLD AUTO: 219 K/UL (ref 164–446)
PMV BLD AUTO: 11.2 FL (ref 9–12.9)
RBC # BLD AUTO: 4.31 M/UL (ref 4.2–5.4)
SARS-COV+SARS-COV-2 AG RESP QL IA.RAPID: NOTDETECTED
SPECIMEN SOURCE: NORMAL
WBC # BLD AUTO: 12 K/UL (ref 4.8–10.8)

## 2021-12-05 PROCEDURE — 700111 HCHG RX REV CODE 636 W/ 250 OVERRIDE (IP): Performed by: STUDENT IN AN ORGANIZED HEALTH CARE EDUCATION/TRAINING PROGRAM

## 2021-12-05 PROCEDURE — 700101 HCHG RX REV CODE 250: Performed by: STUDENT IN AN ORGANIZED HEALTH CARE EDUCATION/TRAINING PROGRAM

## 2021-12-05 PROCEDURE — 770002 HCHG ROOM/CARE - OB PRIVATE (112)

## 2021-12-05 PROCEDURE — 85025 COMPLETE CBC W/AUTO DIFF WBC: CPT

## 2021-12-05 PROCEDURE — 700111 HCHG RX REV CODE 636 W/ 250 OVERRIDE (IP): Performed by: OBSTETRICS & GYNECOLOGY

## 2021-12-05 PROCEDURE — 87426 SARSCOV CORONAVIRUS AG IA: CPT

## 2021-12-05 PROCEDURE — 700105 HCHG RX REV CODE 258: Performed by: OBSTETRICS & GYNECOLOGY

## 2021-12-05 PROCEDURE — 302449 STATCHG TRIAGE ONLY (STATISTIC)

## 2021-12-05 PROCEDURE — 36415 COLL VENOUS BLD VENIPUNCTURE: CPT

## 2021-12-05 PROCEDURE — 303615 HCHG EPIDURAL/SPINAL ANESTHESIA FOR LABOR

## 2021-12-05 PROCEDURE — 700111 HCHG RX REV CODE 636 W/ 250 OVERRIDE (IP)

## 2021-12-05 RX ORDER — BUPIVACAINE HYDROCHLORIDE 2.5 MG/ML
INJECTION, SOLUTION EPIDURAL; INFILTRATION; INTRACAUDAL PRN
Status: DISCONTINUED | OUTPATIENT
Start: 2021-12-05 | End: 2021-12-06 | Stop reason: SURG

## 2021-12-05 RX ORDER — OXYCODONE HYDROCHLORIDE AND ACETAMINOPHEN 5; 325 MG/1; MG/1
1 TABLET ORAL EVERY 4 HOURS PRN
Status: DISCONTINUED | OUTPATIENT
Start: 2021-12-05 | End: 2021-12-07 | Stop reason: HOSPADM

## 2021-12-05 RX ORDER — SODIUM CHLORIDE, SODIUM LACTATE, POTASSIUM CHLORIDE, AND CALCIUM CHLORIDE .6; .31; .03; .02 G/100ML; G/100ML; G/100ML; G/100ML
1000 INJECTION, SOLUTION INTRAVENOUS
Status: DISCONTINUED | OUTPATIENT
Start: 2021-12-05 | End: 2021-12-06 | Stop reason: HOSPADM

## 2021-12-05 RX ORDER — MISOPROSTOL 200 UG/1
800 TABLET ORAL
Status: COMPLETED | OUTPATIENT
Start: 2021-12-05 | End: 2021-12-06

## 2021-12-05 RX ORDER — ONDANSETRON 4 MG/1
4 TABLET, ORALLY DISINTEGRATING ORAL EVERY 6 HOURS PRN
Status: DISCONTINUED | OUTPATIENT
Start: 2021-12-05 | End: 2021-12-07 | Stop reason: HOSPADM

## 2021-12-05 RX ORDER — LIDOCAINE HYDROCHLORIDE AND EPINEPHRINE 15; 5 MG/ML; UG/ML
INJECTION, SOLUTION EPIDURAL
Status: COMPLETED | OUTPATIENT
Start: 2021-12-05 | End: 2021-12-05

## 2021-12-05 RX ORDER — BUPIVACAINE HYDROCHLORIDE 2.5 MG/ML
INJECTION, SOLUTION EPIDURAL; INFILTRATION; INTRACAUDAL
Status: ACTIVE
Start: 2021-12-05 | End: 2021-12-06

## 2021-12-05 RX ORDER — SODIUM CHLORIDE, SODIUM LACTATE, POTASSIUM CHLORIDE, AND CALCIUM CHLORIDE .6; .31; .03; .02 G/100ML; G/100ML; G/100ML; G/100ML
250 INJECTION, SOLUTION INTRAVENOUS PRN
Status: DISCONTINUED | OUTPATIENT
Start: 2021-12-05 | End: 2021-12-06 | Stop reason: HOSPADM

## 2021-12-05 RX ORDER — ONDANSETRON 2 MG/ML
4 INJECTION INTRAMUSCULAR; INTRAVENOUS EVERY 6 HOURS PRN
Status: DISCONTINUED | OUTPATIENT
Start: 2021-12-05 | End: 2021-12-07 | Stop reason: HOSPADM

## 2021-12-05 RX ORDER — SODIUM CHLORIDE, SODIUM LACTATE, POTASSIUM CHLORIDE, CALCIUM CHLORIDE 600; 310; 30; 20 MG/100ML; MG/100ML; MG/100ML; MG/100ML
INJECTION, SOLUTION INTRAVENOUS CONTINUOUS
Status: ACTIVE | OUTPATIENT
Start: 2021-12-05 | End: 2021-12-05

## 2021-12-05 RX ORDER — CARBOPROST TROMETHAMINE 250 UG/ML
250 INJECTION, SOLUTION INTRAMUSCULAR ONCE
Status: DISPENSED | OUTPATIENT
Start: 2021-12-05 | End: 2021-12-06

## 2021-12-05 RX ORDER — ROPIVACAINE HYDROCHLORIDE 2 MG/ML
INJECTION, SOLUTION EPIDURAL; INFILTRATION; PERINEURAL CONTINUOUS
Status: DISCONTINUED | OUTPATIENT
Start: 2021-12-05 | End: 2021-12-07 | Stop reason: HOSPADM

## 2021-12-05 RX ORDER — ROPIVACAINE HYDROCHLORIDE 2 MG/ML
INJECTION, SOLUTION EPIDURAL; INFILTRATION; PERINEURAL
Status: COMPLETED
Start: 2021-12-05 | End: 2021-12-05

## 2021-12-05 RX ORDER — MISOPROSTOL 200 UG/1
800 TABLET ORAL ONCE
Status: DISCONTINUED | OUTPATIENT
Start: 2021-12-05 | End: 2021-12-05

## 2021-12-05 RX ORDER — OXYCODONE AND ACETAMINOPHEN 10; 325 MG/1; MG/1
1 TABLET ORAL EVERY 4 HOURS PRN
Status: DISCONTINUED | OUTPATIENT
Start: 2021-12-05 | End: 2021-12-07 | Stop reason: HOSPADM

## 2021-12-05 RX ORDER — ALUMINA, MAGNESIA, AND SIMETHICONE 2400; 2400; 240 MG/30ML; MG/30ML; MG/30ML
30 SUSPENSION ORAL EVERY 6 HOURS PRN
Status: DISCONTINUED | OUTPATIENT
Start: 2021-12-05 | End: 2021-12-06 | Stop reason: HOSPADM

## 2021-12-05 RX ORDER — IBUPROFEN 600 MG/1
600 TABLET ORAL EVERY 6 HOURS PRN
Status: DISCONTINUED | OUTPATIENT
Start: 2021-12-05 | End: 2021-12-07 | Stop reason: HOSPADM

## 2021-12-05 RX ORDER — ACETAMINOPHEN 500 MG
1000 TABLET ORAL EVERY 6 HOURS PRN
Status: DISCONTINUED | OUTPATIENT
Start: 2021-12-05 | End: 2021-12-07 | Stop reason: HOSPADM

## 2021-12-05 RX ORDER — METHYLERGONOVINE MALEATE 0.2 MG/ML
0.2 INJECTION INTRAVENOUS ONCE
Status: DISPENSED | OUTPATIENT
Start: 2021-12-05 | End: 2021-12-06

## 2021-12-05 RX ORDER — BUPIVACAINE HYDROCHLORIDE 2.5 MG/ML
INJECTION, SOLUTION EPIDURAL; INFILTRATION; INTRACAUDAL
Status: COMPLETED
Start: 2021-12-05 | End: 2021-12-05

## 2021-12-05 RX ADMIN — BUPIVACAINE HYDROCHLORIDE 10 ML: 2.5 INJECTION, SOLUTION EPIDURAL; INFILTRATION; INTRACAUDAL; PERINEURAL at 22:03

## 2021-12-05 RX ADMIN — SODIUM CHLORIDE 5 MILLION UNITS: 900 INJECTION INTRAVENOUS at 16:49

## 2021-12-05 RX ADMIN — ROPIVACAINE HYDROCHLORIDE 200 MG: 2 INJECTION, SOLUTION EPIDURAL; INFILTRATION at 22:20

## 2021-12-05 RX ADMIN — BUPIVACAINE HYDROCHLORIDE 6 ML: 2.5 INJECTION, SOLUTION EPIDURAL; INFILTRATION; INTRACAUDAL; PERINEURAL at 23:05

## 2021-12-05 RX ADMIN — FENTANYL CITRATE 50 MCG: 50 INJECTION, SOLUTION INTRAMUSCULAR; INTRAVENOUS at 14:53

## 2021-12-05 RX ADMIN — LIDOCAINE HYDROCHLORIDE,EPINEPHRINE BITARTRATE 3 ML: 15; .005 INJECTION, SOLUTION EPIDURAL; INFILTRATION; INTRACAUDAL; PERINEURAL at 22:03

## 2021-12-05 RX ADMIN — SODIUM CHLORIDE, POTASSIUM CHLORIDE, SODIUM LACTATE AND CALCIUM CHLORIDE: 600; 310; 30; 20 INJECTION, SOLUTION INTRAVENOUS at 16:45

## 2021-12-05 RX ADMIN — Medication 1 MILLI-UNITS/MIN: at 16:30

## 2021-12-05 RX ADMIN — ROPIVACAINE HYDROCHLORIDE 200 MG: 2 INJECTION, SOLUTION EPIDURAL; INFILTRATION; PERINEURAL at 22:20

## 2021-12-05 ASSESSMENT — LIFESTYLE VARIABLES
ON A TYPICAL DAY WHEN YOU DRINK ALCOHOL HOW MANY DRINKS DO YOU HAVE: 0
EVER FELT BAD OR GUILTY ABOUT YOUR DRINKING: NO
TOTAL SCORE: 0
HAVE YOU EVER FELT YOU SHOULD CUT DOWN ON YOUR DRINKING: NO
HOW MANY TIMES IN THE PAST YEAR HAVE YOU HAD 5 OR MORE DRINKS IN A DAY: 0
CONSUMPTION TOTAL: NEGATIVE
TOTAL SCORE: 0
DOES PATIENT WANT TO STOP DRINKING: NO
EVER HAD A DRINK FIRST THING IN THE MORNING TO STEADY YOUR NERVES TO GET RID OF A HANGOVER: NO
AVERAGE NUMBER OF DAYS PER WEEK YOU HAVE A DRINK CONTAINING ALCOHOL: 0
TOTAL SCORE: 0
ALCOHOL_USE: NO
HAVE PEOPLE ANNOYED YOU BY CRITICIZING YOUR DRINKING: NO
EVER_SMOKED: NEVER

## 2021-12-05 ASSESSMENT — PATIENT HEALTH QUESTIONNAIRE - PHQ9
2. FEELING DOWN, DEPRESSED, IRRITABLE, OR HOPELESS: NOT AT ALL
1. LITTLE INTEREST OR PLEASURE IN DOING THINGS: NOT AT ALL
SUM OF ALL RESPONSES TO PHQ9 QUESTIONS 1 AND 2: 0

## 2021-12-05 NOTE — PROGRESS NOTES
Patient comes in with concerns that her water broke at 1130 with clear fluid.  She feels fetal movement and is having occasional contractions.  She has a cerclage in place.  She had a c/s followed by two VBACs.      Dr Boo called and notified.  Patient to be admitted.      Report given to Mini LANGLEY

## 2021-12-05 NOTE — H&P
"Labor and Delivery History and Physical    CC: My water broke    HPI: 25yo  at 36w6d, EDC 2021. Pt presents to L&D with complaint that her water broke at 1130am today while at [employer]. Clear fluid. No vaginal bleeding. Occas contractions. Baby moving well. She sees Dr. Wade for her prenatal care. Pt has had 1 prior  followed by 2 successful VBACs. She desires a trial of labor with this pregnancy as well.    All other systems were reviewed and were negative except as stated above.    PMH: depression    PSH:  section x 1, 3 cerclage placements, D&E    OB HX: hx of 18wk SAB due to ICO, hx of TAB, 36wk primary , 34wk  complicated by PPH, 38wk     Gyn Hx: no abnormal pap smears, no hx of STDs, denies hx of herpes (oral or genital)    SH: no T/E/D    FH: DM, high cholesterol, leukemia    Meds: PNV    Allergies: NKDA    /81   Pulse 93   Temp 36.3 °C (97.3 °F) (Temporal)   Resp 18   Ht 1.651 m (5' 5\")   Wt 95.3 kg (210 lb)   LMP 2021 (Exact Date)   BMI 34.95 kg/m²     Physical Exam  Gen: NAD  Abd: soft, gravid, non tender, no rebound or guarding  EFW: 7 pounds by [place]  Ext: no edema, nontender    Pelvic exam: on right labia majora in mid portion, there is a 1.2cm x 8mm area of erythema present on the skin, in the area of erythema there are few scattered pale yellow/white patches. The patient reports she has had this lesion for the past week and she has been itching it daily and picking at it. Denies for it to have been blisters or denies having pain. Patient and her  were questioned about history of genital or oral herpes in the past and they both adamantly deny a history of herpes. I educated pt and her  on the risks of genital herpes with vaginal delivery. They are aware that  section should be performed if a patient has active herpes outbreak. They are aware of the risk of transfer of herpes to the baby with vaginal birth, risk " of  herpes, herpes encephalitis, NICU admission, and death. They asked us for time to discuss this alone so nursing and I left the room. Upon our reentry into the room, both the patient and her  state they are sure they do not have herpes and want to proceed with vaginal delivery. They decline delivery via  section.      CERCLAGE REMOVAL --  SSE: sterile speculum placed, cerclage knot present on anterior surface of cervix, difficulty removing the cerclage due to vaginal walls collapsing into view and redundant cervical tissues, one suture of cerclage was eventually cut with metzenbaum scissors, upon pulling of the cerclage the remainder of the suture would not come out of cervix, it was scarred into the cervix, pt declined any further attempt at removal and so the other end of the cerclage was cut, there is still a small portion of suture left in cervix which will be removed after delivery.       FHT: reactive, category 1 FHR  [place]: occas contractions  SVE: 3/70/-4, vertex by exam and by bedside ultrasound    Recent Labs     21  1310   WBC 12.0*   RBC 4.31   HEMOGLOBIN 11.2*   HEMATOCRIT 34.4*   MCV 79.8*   MCH 26.0*   RDW 47.1   PLATELETCT 219   MPV 11.2   NEUTSPOLYS 72.50*   LYMPHOCYTES 16.50*   MONOCYTES 8.20   EOSINOPHILS 0.90   BASOPHILS 0.40         Laboratory Evaluation  ABO: O+  GBS: unknown  GTT: 139  Rubella: Immune  HIV: Neg  RPR: NR  HepBsAg: neg  Hep C: neg      Assessment:   25yo  at 36w6d  PPROM  ICO with cerclage now removed  Prior  section x 1 followed by 2 successful VBACs, desires  this pregnancy  Desires sterilization if she ends up with   GBS unknown    Plan:  Admit to L&D  Fetal monitoring and toco  IV fluids  HSV IgG testing ordered  PCN for GBS prophylaxis due to 36wks and GBS unknown  Fentanyl/epidural when pt requests  Anticipate vaginal delivery        Crystal Crisostomo M.D.

## 2021-12-06 LAB
ERYTHROCYTE [DISTWIDTH] IN BLOOD BY AUTOMATED COUNT: 46.9 FL (ref 35.9–50)
HCT VFR BLD AUTO: 33.7 % (ref 37–47)
HGB BLD-MCNC: 10.7 G/DL (ref 12–16)
MCH RBC QN AUTO: 25.5 PG (ref 27–33)
MCHC RBC AUTO-ENTMCNC: 31.8 G/DL (ref 33.6–35)
MCV RBC AUTO: 80.2 FL (ref 81.4–97.8)
PLATELET # BLD AUTO: 184 K/UL (ref 164–446)
PMV BLD AUTO: 11 FL (ref 9–12.9)
RBC # BLD AUTO: 4.2 M/UL (ref 4.2–5.4)
WBC # BLD AUTO: 15.2 K/UL (ref 4.8–10.8)

## 2021-12-06 PROCEDURE — 700111 HCHG RX REV CODE 636 W/ 250 OVERRIDE (IP): Performed by: OBSTETRICS & GYNECOLOGY

## 2021-12-06 PROCEDURE — 86696 HERPES SIMPLEX TYPE 2 TEST: CPT

## 2021-12-06 PROCEDURE — 770002 HCHG ROOM/CARE - OB PRIVATE (112)

## 2021-12-06 PROCEDURE — 0UCC7ZZ EXTIRPATION OF MATTER FROM CERVIX, VIA NATURAL OR ARTIFICIAL OPENING: ICD-10-PCS | Performed by: OBSTETRICS & GYNECOLOGY

## 2021-12-06 PROCEDURE — 86694 HERPES SIMPLEX NES ANTBDY: CPT

## 2021-12-06 PROCEDURE — 303615 HCHG EPIDURAL/SPINAL ANESTHESIA FOR LABOR

## 2021-12-06 PROCEDURE — A9270 NON-COVERED ITEM OR SERVICE: HCPCS | Performed by: OBSTETRICS & GYNECOLOGY

## 2021-12-06 PROCEDURE — 36415 COLL VENOUS BLD VENIPUNCTURE: CPT

## 2021-12-06 PROCEDURE — 99999 PR NO CHARGE: CPT | Performed by: OBSTETRICS & GYNECOLOGY

## 2021-12-06 PROCEDURE — 59409 OBSTETRICAL CARE: CPT

## 2021-12-06 PROCEDURE — 700102 HCHG RX REV CODE 250 W/ 637 OVERRIDE(OP): Performed by: OBSTETRICS & GYNECOLOGY

## 2021-12-06 PROCEDURE — 85027 COMPLETE CBC AUTOMATED: CPT

## 2021-12-06 PROCEDURE — 304965 HCHG RECOVERY SERVICES

## 2021-12-06 PROCEDURE — 86695 HERPES SIMPLEX TYPE 1 TEST: CPT

## 2021-12-06 RX ORDER — MISOPROSTOL 200 UG/1
800 TABLET ORAL
Status: DISCONTINUED | OUTPATIENT
Start: 2021-12-06 | End: 2021-12-07 | Stop reason: HOSPADM

## 2021-12-06 RX ORDER — VITAMIN A ACETATE, BETA CAROTENE, ASCORBIC ACID, CHOLECALCIFEROL, .ALPHA.-TOCOPHEROL ACETATE, DL-, THIAMINE MONONITRATE, RIBOFLAVIN, NIACINAMIDE, PYRIDOXINE HYDROCHLORIDE, FOLIC ACID, CYANOCOBALAMIN, CALCIUM CARBONATE, FERROUS FUMARATE, ZINC OXIDE, CUPRIC OXIDE 3080; 12; 120; 400; 1; 1.84; 3; 20; 22; 920; 25; 200; 27; 10; 2 [IU]/1; UG/1; MG/1; [IU]/1; MG/1; MG/1; MG/1; MG/1; MG/1; [IU]/1; MG/1; MG/1; MG/1; MG/1; MG/1
1 TABLET, FILM COATED ORAL
Status: DISCONTINUED | OUTPATIENT
Start: 2021-12-06 | End: 2021-12-07 | Stop reason: HOSPADM

## 2021-12-06 RX ORDER — SODIUM CHLORIDE, SODIUM LACTATE, POTASSIUM CHLORIDE, CALCIUM CHLORIDE 600; 310; 30; 20 MG/100ML; MG/100ML; MG/100ML; MG/100ML
INJECTION, SOLUTION INTRAVENOUS PRN
Status: DISCONTINUED | OUTPATIENT
Start: 2021-12-06 | End: 2021-12-07 | Stop reason: HOSPADM

## 2021-12-06 RX ORDER — DOCUSATE SODIUM 100 MG/1
100 CAPSULE, LIQUID FILLED ORAL 2 TIMES DAILY
Status: DISCONTINUED | OUTPATIENT
Start: 2021-12-06 | End: 2021-12-07 | Stop reason: HOSPADM

## 2021-12-06 RX ADMIN — IBUPROFEN 600 MG: 600 TABLET, FILM COATED ORAL at 10:19

## 2021-12-06 RX ADMIN — MISOPROSTOL 800 MCG: 200 TABLET ORAL at 00:35

## 2021-12-06 RX ADMIN — IBUPROFEN 600 MG: 600 TABLET, FILM COATED ORAL at 03:44

## 2021-12-06 RX ADMIN — ACETAMINOPHEN 1000 MG: 500 TABLET ORAL at 19:40

## 2021-12-06 RX ADMIN — OXYTOCIN 125 ML/HR: 10 INJECTION, SOLUTION INTRAMUSCULAR; INTRAVENOUS at 01:00

## 2021-12-06 RX ADMIN — PRENATAL WITH FERROUS FUM AND FOLIC ACID 1 TABLET: 3080; 920; 120; 400; 22; 1.84; 3; 20; 10; 1; 12; 200; 27; 25; 2 TABLET ORAL at 08:56

## 2021-12-06 RX ADMIN — DOCUSATE SODIUM 100 MG: 100 CAPSULE ORAL at 18:14

## 2021-12-06 RX ADMIN — ACETAMINOPHEN 1000 MG: 500 TABLET ORAL at 08:56

## 2021-12-06 ASSESSMENT — EDINBURGH POSTNATAL DEPRESSION SCALE (EPDS)
I HAVE BEEN SO UNHAPPY THAT I HAVE HAD DIFFICULTY SLEEPING: NOT AT ALL
I HAVE FELT SAD OR MISERABLE: NO, NOT AT ALL
I HAVE BLAMED MYSELF UNNECESSARILY WHEN THINGS WENT WRONG: NOT VERY OFTEN
THINGS HAVE BEEN GETTING ON TOP OF ME: NO, I HAVE BEEN COPING AS WELL AS EVER
I HAVE FELT SCARED OR PANICKY FOR NO GOOD REASON: NO, NOT AT ALL
I HAVE LOOKED FORWARD WITH ENJOYMENT TO THINGS: AS MUCH AS I EVER DID
THE THOUGHT OF HARMING MYSELF HAS OCCURRED TO ME: NEVER
I HAVE BEEN ANXIOUS OR WORRIED FOR NO GOOD REASON: HARDLY EVER
I HAVE BEEN ABLE TO LAUGH AND SEE THE FUNNY SIDE OF THINGS: AS MUCH AS I ALWAYS COULD
I HAVE BEEN SO UNHAPPY THAT I HAVE BEEN CRYING: NO, NEVER

## 2021-12-06 ASSESSMENT — PAIN DESCRIPTION - PAIN TYPE
TYPE: ACUTE PAIN

## 2021-12-06 NOTE — PROGRESS NOTES
Late entry due to patient care:  Report received from KORIN Lange.  admitted for PPROM at 36-6, not in labor, no bleeding. Cerclage removed by Dr. Crisostomo as per her note. Right labial lesion discussed with patient and FOB. Pt did report to me that she gets these bumps every time she shaves and it only itched once she started picking at it. Also states her primary OB had evaluated it on Wed. Pt denies hx of herpes or any other STDs except for history of syphilis greater than 10 years ago. Pt reports her only depression occurs early in all of her pregnancies only. Denies depression now. Labor did not start spontaneously so Pit started after IUPC placed by MD. Considering epidural when needed. Report to Christie at 1900.

## 2021-12-06 NOTE — ANESTHESIA TIME REPORT
Anesthesia Start and Stop Event Times     Date Time Event    12/5/2021 2202 Ready for Procedure     2203 Anesthesia Start    12/6/2021 0024 Anesthesia Stop        Responsible Staff  12/05/21 to 12/06/21    Name Role Begin End    Min LAUREL Fields M.D. Anesth 2203 0024        Preop Diagnosis (Free Text):  Pre-op Diagnosis             Preop Diagnosis (Codes):    Premium Reason  E. Weekend    Comments:

## 2021-12-06 NOTE — ANESTHESIA PREPROCEDURE EVALUATION
Date: 21  Procedure: Labor Epidural     27 yo F,  s/p C/Sx1 then 2 , cerclage during this pregnancy, requesting labor epidural. Plt 219 today. Not on AC.    Relevant Problems   No relevant active problems       Physical Exam    Airway   Mallampati: II  TM distance: >3 FB  Neck ROM: full       Cardiovascular - normal exam  Rhythm: regular  Rate: normal  (-) murmur     Dental - normal exam           Pulmonary - normal exam  Breath sounds clear to auscultation     Abdominal     Comments: gravid   Neurological - normal exam                 Anesthesia Plan    ASA 2       Plan - epidural   Neuraxial block will be labor analgesia                  Pertinent diagnostic labs and testing reviewed    Informed Consent:    Anesthetic plan and risks discussed with patient.

## 2021-12-06 NOTE — ANESTHESIA POSTPROCEDURE EVALUATION
Patient: Tricia Rees    Procedure Summary     Date: 12/05/21 Room / Location:     Anesthesia Start: 2203 Anesthesia Stop: 12/06/21 0024    Procedure: Labor Epidural Diagnosis:     Scheduled Providers:  Responsible Provider: Jatinder Fields M.D.    Anesthesia Type: epidural ASA Status: 2          Final Anesthesia Type: epidural  Last vitals  BP   Blood Pressure: 133/83    Temp   37.5 °C (99.5 °F)    Pulse   86   Resp   18    SpO2   98 %      Anesthesia Post Evaluation    Patient location during evaluation: bedside  Patient participation: complete - patient participated  Level of consciousness: awake and alert    Airway patency: patent  Anesthetic complications: no  Cardiovascular status: hemodynamically stable  Respiratory status: acceptable  Hydration status: euvolemic    PONV: none    patient able to participate, but full recovery from regional anesthesia has not occurred and is not expected within the stipulated timeframe for the completion of the evaluation      No complications documented.     Nurse Pain Score: 0 (NPRS)

## 2021-12-06 NOTE — PROGRESS NOTES
MD L&D progress note     S: pt comfortable w/ epidural, RN obtained pt's negative GBS culture results    O: VSS afebrile  FHR - 130s, pos accels, neg decels, mod variability, cat 1 FHR  [place] - every 4-5 minutes  SVE - deferred     A: IUP at 36+6wks, PPROM, hx of  x 2 followed by 2 VBACs, GBS negative, ICO s/p cerclage removal, desires sterilization     P: Continue labor management, fetal monitoring/toco, IV fluids, pitocin to achieve adequate MVU, d/c PCN, epidural when pt requests, anticipate vaginal delivery

## 2021-12-06 NOTE — PROGRESS NOTES
1900-report received from KORIN Morse. Plan of care discussed. Pt aware to request epidural when desires  2145-pt requesting epidural. Bolus initiated and Dr. Fields notified  2230-pt resting with epidural in place. SVE 4/80/-2  2311-Dr. Fields at bedside to assess pain. Pt reports intense vaginal pain described as burning, stabbing and pressure with ctx. She feels relief throughout abdomen and back with epidural but vaginal pain persists  2330-pt reports pain improving   2345-pt reports rectal pressure. SVE 10/100/0  0024-infant boy delivered APGARS 8/9  0200-bleeding light-moderate with clots. Fundus firm at umbilicus. Pt denies needing pain intervention at this time  0330-pt unable to fully ambulated due to epidural, has some movement. Attempted bedpan without success. Pt requesting catheter to empty bladder. 400 ml released. Pain treated with ibuprofen  0430-pt transferred to  in stable condition. Report given to KORIN Veloz

## 2021-12-06 NOTE — PROGRESS NOTES
Patient brought to postpartum 0420. Patient oriented to room and floor. Patient updated on plan of care. All questions answered.

## 2021-12-06 NOTE — L&D DELIVERY NOTE
"Vaginal Delivery Procedure Note:    Tricia Rees is a 26 y.o. , now Para 2224     Weeks of gestation: 37w0d  Diagnosis: PPROM, ICO s/p cerclage removal, Prior  x 1 followed by successful VBACs, GBS neg     of viable male infant named \"Sp\" over intact perineum at 0024. Vertex, OA. Nuchal cord not present. Anterior and posterior shoulders delivered with ease. Nose and mouth suctioned with bulb. Infant placed on maternal abdomen. Delayed cord clamp performed. Cord then clamped and cut by FOB.  APGARs 8 and 9  Birth weight - pending  Placenta delivered intact in [facility] with fundal massage and gentle cord traction at 0027. 3 Vessel cord.  Laceration: none.  Attempted to remove remaining fragment in cervix. Cervix palpated and fragment was palpable under the cervical mucosa in the posterior left cervix. There was no exposed portion of the stitch at any location on the cervix that could be grasped. The fragment was completely embedded and scarred into the cervix. In order to remove the fragment we would need to go to the OR and make an incision in the cervix to excise the stitch fragment. Pt declines this procedure to be done. Stitch was left in the cervix.   Fundus firm at just below umbilicus. Excellent hemostasis. 800mcg misoprostol placed rectal due to prior history of postpartum hemorrhage.   mL  Anesthesia - epidural  Sponge and needle counts correct.  Patient tolerated procedure well. Mother and baby bonding skin to skin.    "

## 2021-12-06 NOTE — CARE PLAN
The patient is Stable - Low risk of patient condition declining or worsening    Shift Goals  Clinical Goals: remain clinically stable    Progress made toward(s) clinical / shift goals:  Patient will ask for pain medication when needed.  Patient has scant to light lochia with a firm palpable uterus.  Vital signs are within defined limits.  Assessment will continue.     Patient is not progressing towards the following goals: na

## 2021-12-06 NOTE — ANESTHESIA PROCEDURE NOTES
Epidural Block    Date/Time: 12/5/2021 10:03 PM  Performed by: Jatinder Fields M.D.  Authorized by: Jatinder Fields M.D.     Patient Location:  OB  Start Time:  12/5/2021 10:03 PM  End Time:  12/5/2021 10:14 PM  Reason for Block: labor analgesia    patient identified, IV checked, site marked, risks and benefits discussed, surgical consent, monitors and equipment checked, pre-op evaluation and timeout performed    Patient Position:  Sitting  Prep: ChloraPrep, patient draped and sterile technique    Monitoring:  Blood pressure, continuous pulse oximetry and heart rate  Approach:  Midline  Location:  L3-L4  Injection Technique:  YORDY saline  Skin infiltration:  Lidocaine  Strength:  1%  Dose:  3ml  Needle Type:  Tuohy  Needle Gauge:  17 G  Needle Length:  3.5 in  Loss of resistance::  6  Catheter Size:  19 G  Catheter at Skin Depth:  12  Test Dose Result:  Negative  Events: paresthesia-transient/resolved     Had transient paresthesia to RLE which resolved after redirecting the touhy to the left.  Bolused epidural w/ Bupiv 0.125% 5mL x2.

## 2021-12-06 NOTE — CARE PLAN
Problem: Knowledge Deficit - L&D  Goal: Patient and family/caregivers will demonstrate understanding of plan of care, disease process/condition, diagnostic tests and medications  12/6/2021 0208 by Christie Sotelo R.N.  Outcome: Progressing  Note: Pt verbalized understanding of cytotec mechanism and utilization.  12/6/2021 0208 by Christie Sotelo R.N.  Outcome: Progressing     Problem: Risk for Excess Fluid Volume  Goal: Patient will demonstrate pulse, blood pressure and neurologic signs within expected ranges and without any respiratory complications  12/6/2021 0208 by Christie Sotelo R.N.  Outcome: Progressing  Note: Pts BP has remained within normal limits.  12/6/2021 0208 by Christie Sotelo R.N.  Outcome: Progressing   The patient is Stable - Low risk of patient condition declining or worsening    Progress made toward(s) clinical / shift goals:  progressing    Patient is not progressing towards the following goals: NA

## 2021-12-06 NOTE — LACTATION NOTE
This note was copied from a baby's chart.  Mom wilmer 27 y/o P3 who delivered baby boy weighing 6 # 0.7 oz at 37 wks. Mom reports darker and enlarged areolas during pregnancy. Mom denies any breast surgeries, diabetes, thyroid or fertility issues. Mom breast fed her other children effectively and without problems. This a baby is an LPI and LC discussed characteristics of a 37 week baby with parents. LC assisted baby to right side in cross cradle hold. Mom has reported that her nipples were uncomfortable. LC discussed importance of proper positioning at breast and not rushing to latch. Encourgaed skin to during waking hours. Reviewed with parents the possibility of starting pumping and supplementing should baby have any suboptimal feeds or increased weight loss this evening.     Mom has Medicaid insurance and does not have a pump at home. PROSPER sent referral to [facility] for mother.   Lactation to follow up in the morning

## 2021-12-07 ENCOUNTER — PHARMACY VISIT (OUTPATIENT)
Dept: PHARMACY | Facility: MEDICAL CENTER | Age: 26
End: 2021-12-07
Payer: COMMERCIAL

## 2021-12-07 VITALS
HEART RATE: 65 BPM | OXYGEN SATURATION: 99 % | BODY MASS INDEX: 34.99 KG/M2 | HEIGHT: 65 IN | SYSTOLIC BLOOD PRESSURE: 110 MMHG | RESPIRATION RATE: 18 BRPM | WEIGHT: 210 LBS | DIASTOLIC BLOOD PRESSURE: 68 MMHG | TEMPERATURE: 98 F

## 2021-12-07 PROCEDURE — A9270 NON-COVERED ITEM OR SERVICE: HCPCS | Performed by: OBSTETRICS & GYNECOLOGY

## 2021-12-07 PROCEDURE — 700102 HCHG RX REV CODE 250 W/ 637 OVERRIDE(OP): Performed by: OBSTETRICS & GYNECOLOGY

## 2021-12-07 PROCEDURE — RXMED WILLOW AMBULATORY MEDICATION CHARGE: Performed by: OBSTETRICS & GYNECOLOGY

## 2021-12-07 RX ORDER — ACETAMINOPHEN 500 MG
1000 TABLET ORAL EVERY 6 HOURS PRN
Qty: 30 TABLET | Refills: 0 | COMMUNITY
Start: 2021-12-07

## 2021-12-07 RX ORDER — IBUPROFEN 600 MG/1
600 TABLET ORAL EVERY 6 HOURS PRN
Qty: 30 TABLET | Refills: 1 | Status: SHIPPED | OUTPATIENT
Start: 2021-12-07 | End: 2022-08-26

## 2021-12-07 RX ADMIN — PRENATAL WITH FERROUS FUM AND FOLIC ACID 1 TABLET: 3080; 920; 120; 400; 22; 1.84; 3; 20; 10; 1; 12; 200; 27; 25; 2 TABLET ORAL at 09:13

## 2021-12-07 NOTE — CARE PLAN
The patient is Stable - Low risk of patient condition declining or worsening    Shift Goals  Clinical Goals: remain clinically stable    Progress made toward(s) clinical / shift goals:  Patient will ask for pain medication when needed.  Patient is free from signs and symptoms of infection at this time.  Assessment will continue.     Patient is not progressing towards the following goals: na

## 2021-12-07 NOTE — LACTATION NOTE
This note was copied from a baby's chart.  Lactation follow up.  Discharge orders to go home today.  Observed baby breastfeeding with great latch and suck.  Reviewed breastfeeding basic information with mom and discussed need to breastfeed frequently, with cues and at least every 2-3 hours.  MOB voices that she had to supplement all previous children in the first week of life because of jaundice and is not concerned if she will need to do this again and she was able to go on to breastfeed for a year with all three of them.  OP lactation information sheet provided and recommended contacting [facility] if desired to assist with lactation concerns after going home.  Hand pump ordered and delivered to room.    [place] breastfeeding videos provided  [facility] website information sheet provided  [facility] breastfeeding resource sheet provided

## 2021-12-07 NOTE — DISCHARGE PLANNING
Discharge Planning Assessment Post Partum    Reason for Referral: History of depression  Address: [address] [place] [address] [address] [place] [address]  Phone: [phone]  Type of Living Situation: living with FOB and children  Mom Diagnosis: Pregnancy  Baby Diagnosis: -37 weeks  Primary Language: English    Name of Baby: Sp Long (: 21)  Father of the Baby: Aldo Long  Involved in baby’s care? Yes  Contact Information: [phone]    Prenatal Care: Yes  Mom's PCP: Dr. Sunshine  PCP for new baby: Dr. Sunshine    Support System: FOB  Coping/Bonding between mother & baby: Yes  Source of Feeding: breast feeding  Supplies for Infant: prepared for infant except for a car seat.  Provided MOB with phone number to the [employer]MT car seat program.  Discussed that she will need to call to see if they have an appointment available.  Asked if she has a friend or family member she could borrow a car seat from for discharge.  MOB stated she is calling around to see if she can find one available.  Explained that they will need a car seat for discharge.    Mom's Insurance: Medicaid  Baby Covered on Insurance:Yes  Mother Employed/School: [employer]  Other children in the home/names & ages: three children; ages 9, 5, and almost 2 years    Financial Hardship/Income: No   Mom's Mental status: alert and oriented  Services used prior to admit: Medicaid and a referral has been [facility] to [facility]    CPS History: No  Psychiatric History: history of depression.  MOB scored a 2 on the depression scale.  Provided MOB with a list of counseling and support group resources specializing in maternal mental health  Domestic Violence History: No  Drug/ETOH History: No    Resources Provided: post partum support and counseling and a children and family resource list  Referrals Made: diaper bank referral provided     Clearance for Discharge: Infant is cleared to discharge home with parents

## 2021-12-07 NOTE — DISCHARGE SUMMARY
Discharge Summary:      Tricia Rees    Admit Date:   2021  Discharge Date:  2021     Admitting diagnosis:  Indication for care in labor or delivery [O75.9]  Cerclage in place, removed on presentation  Trial of labor after  section  PPROM at 36w6d  Labor  Discharge Diagnosis: Status post .  Pregnancy Complications: Hx of cervical insufficiency  Tubal Ligation:  no        History:  Past Medical History:   Diagnosis Date   •  treated with cerclage at 14 weeks gestation 2012   • Gestational diabetes    • HBsAg (hepatitis B surface antigen) positive, currently pregnant ([facility]) 2012   • Incompetent cervix in pregnancy 2012   • Powell cerclage present 2012     OB History    Para Term  AB Living   6 4 4   2 4   SAB IAB Ectopic Molar Multiple Live Births   1       0 4      # Outcome Date GA Lbr Keven/2nd Weight Sex Delivery Anes PTL Lv   6 Term 21 37w0d 36:15 2.74 kg (6 lb 0.7 oz) M Vag-Spont EPI N BEST   5 Term 19 38w1d 04:42 / 00:06 2.745 kg (6 lb 0.8 oz) F  None N BEST   4 AB 2017           3 Term 2016        BEST   2 Term 12 37w0d  3.685 kg (8 lb 2 oz) M CS-LTranv Spinal  BEST      Birth Comments: unable to push.   1 SAB  18w0d   M SAB None        Birth Comments: probable incompetent cervix, PPROM and was found to be 4 cm.          Patient has no known allergies.  Patient Active Problem List    Diagnosis Date Noted   • Indication for care in labor or delivery 2021   • Labor and delivery indication for care or intervention 2021   • Supervision of other high-risk pregnancy 2012   • HBsAg (hepatitis B surface antigen) positive, currently pregnant ([facility]) 2012   •  treated with cerclage at 14 weeks gestation 2012   • Powell cerclage present 2012        Hospital Course:   26 y.o. , now para 4, was admitted with the above mentioned diagnosis, underwent Active Labor, cerclage removal, vaginal  birth after . Patient postpartum course was unremarkable, with progressive advancement in diet , ambulation and toleration of oral analgesia. Patient without complaints today and desires discharge.      Vitals:    21 1100 21 1501 21 1814 21 0549   BP: 121/76 129/80 130/82 110/68   Pulse: 83 77 94 65   Resp: 18 17 17 18   Temp: 36.6 °C (97.8 °F) 37 °C (98.6 °F) 36.7 °C (98 °F) 36.7 °C (98 °F)   TempSrc: Temporal Temporal Temporal Temporal   SpO2: 99% 99% 97% 99%   Weight:       Height:           Current Facility-Administered Medications   Medication Dose   • LR infusion     • PRN oxytocin (PITOCIN) (20 Units/1000 mL) PRN for excessive uterine bleeding - See Admin Instr  125-999 mL/hr   • miSOPROStol (CYTOTEC) tablet 800 mcg  800 mcg   • docusate sodium (COLACE) capsule 100 mg  100 mg   • magnesium hydroxide (MILK OF MAGNESIA) suspension 30 mL  30 mL   • tetanus-dipth-acell pertussis (Tdap) inj 0.5 mL  0.5 mL   • prenatal plus vitamin (STUARTNATAL 1+1) 27-1 MG tablet 1 Tablet  1 Tablet   • ondansetron (ZOFRAN ODT) dispertab 4 mg  4 mg    Or   • ondansetron (ZOFRAN) syringe/vial injection 4 mg  4 mg   • oxytocin (PITOCIN) infusion (for postpartum)   mL/hr   • ibuprofen (MOTRIN) tablet 600 mg  600 mg   • acetaminophen (TYLENOL) tablet 1,000 mg  1,000 mg   • oxyCODONE-acetaminophen (PERCOCET) 5-325 MG per tablet 1 Tablet  1 Tablet   • oxyCODONE-acetaminophen (PERCOCET-10)  MG per tablet 1 Tablet  1 Tablet   • oxytocin (PITOCIN) 20 UNITS/1000ML LR (induction of labor)  0.5-20 dulce maria-units/min   • ropivacaine 0.2 % (NAROPIN) injection         Exam:  Gen: NAD, AAO  Abdomen: Abdomen soft, non-tender. No masses,  No organomegally, FF @ U. No rebound/guarding.  Fundus Tender: No  Incision: none  Extremity: 1+ edema, no redness or tenderness in the calves or thighs, 2+DPP, Homans sign is negative, no sign of DVT       Labs:  Recent Labs     21  1310 21  0846   WBC 12.0*  15.2*   RBC 4.31 4.20   HEMOGLOBIN 11.2* 10.7*   HEMATOCRIT 34.4* 33.7*   MCV 79.8* 80.2*   MCH 26.0* 25.5*   MCHC 32.6* 31.8*   RDW 47.1 46.9   PLATELETCT 219 184   MPV 11.2 11.0        Activity:   Discharge to home  Pelvic Rest x 6 weeks    Assessment:  normal postpartum course  Discharge Assessment: No heavy bleeding or foul vaginal discharge      Follow up: 6wk with Corinne E Capurro, M.D.       Discharge Meds:   Current Outpatient Medications   Medication Sig Dispense Refill   • ibuprofen (MOTRIN) 600 MG Tab Take 1 Tablet by mouth every 6 hours as needed. 30 Tablet 1   • acetaminophen (TYLENOL) 500 MG Tab Take 2 Tablets by mouth every 6 hours as needed. 30 Tablet 0       Ady Benson M.D.

## 2021-12-07 NOTE — PROGRESS NOTES
1900- Received report from KORIN Gottlieb. Assumed care of pt.     1940- Assessment complete. Fundus firm and palpable, lochia light rubra. Pain medication given for cramping (see MAR). Pt. Bonding with infant appropriately and breastfeeding comfotably per pt. Discharge education paperwork discussed and all questions and concerns addressed at this time. Paperwork signed and dated. Encouraged pt. To call with needs. Will continue to monitor.

## 2021-12-07 NOTE — DISCHARGE PLANNING
Meds-to-Beds: Discharge prescription order listed below delivered to patient's bedside. KORIN Gottlieb notified. Patient counseled.      Current Outpatient Medications   Medication Sig Dispense Refill   • ibuprofen (MOTRIN) 600 MG Tab Take 1 Tablet by mouth every 6 hours as needed. 30 Tablet 1      Mechelle Neves, PharmD

## 2021-12-07 NOTE — CARE PLAN
The patient is Stable - Low risk of patient condition declining or worsening    Shift Goals  Clinical Goals: VSS, pain management    Progress made toward(s) clinical / shift goals: Patient pain well managed with pain medication as needed. VSS and WDL.     Patient is not progressing towards the following goals:

## 2021-12-07 NOTE — DISCHARGE INSTRUCTIONS
PATIENT DISCHARGE EDUCATION INSTRUCTION SHEET  REASONS TO CALL YOUR OBSTETRICIAN  · Persistent fever, shaking, chills (Temperature higher than 100.4) may indicate you have an infection  · Heavy bleeding: soaking more than 1 pad per hour; Passing clots an egg-sized clot or bigger may mean you have an postpartum hemorrhage  · Foul odor from vagina or bad smelling or discolored discharge or blood  · Breast infection (Mastitis symptoms); breast pain, chills, fever, redness or red streaks, may feel flu like symptoms  · Urinary pain, burning or frequency  · Incision that is not healing, increased redness, swelling, tenderness or pain, or any pus from episiotomy or  site may mean you have an infection  · Redness, swelling, warmth, or painful to touch in the calf area of your leg may mean you have a blood clot  · Severe or intensified depression, thoughts or feelings of wanting to hurt yourself or someone else   · Pain in chest, obstructed breathing or shortness of breath (trouble catching your breath) may mean you are having a postpartum complication. Call your provider immediately   · Headache that does not get better, even after taking medicine, a bad headache with vision changes or pain in the upper right area of your belly may mean you have high blood pressure or post birth preeclampsia. Call your provider immediately    HAND WASHING  All family and friends should wash their hands:  · Before and after holding the baby  · Before feeding the baby  · After using the restroom or changing the baby's diaper    WOUND CARE  Ask your physician for additional care instructions. In general:  ·  Incision:  · May shower and pat incision dry   · Keep the incision clean and dry  · There should not be any opening or pus from the incision  · Continue to walk at home 3 times a day   · Do NOT lift anything heavier than your baby (over 10 pounds)  · Encourage family to help participate in care of the  to allow  rest and mom time to heal  · Episiotomy/Laceration  · May use alia-spray bottle, witch hazel pads and dermaplast spray for comfort  · Use alia-spray bottle after urinating to cleanse perineal area  · To prevent burning during urination spray alia-water bottle on labial area   · Pat perineal area dry until episiotomy/laceration is healed  · Continue to use alia-bottle until bleeding stops as needed  · If have a 2nd degree laceration or greater, a Sitz bath can offer relief from soreness, burning, and inflammation   · Sitz Bath   · Sit in 6 inches of warm water and soak laceration as needed until the laceration heals    VAGINAL CARE AND BLEEDING  · Nothing inside vagina for 6 weeks:   · No sexual intercourse, tampons or douching  · Bleeding may continue for 2-4 weeks. Amount and color may vary  · Soaking 1 pad or more in an hour for several hours is considered heavy bleeding  · Passing large egg sized blood clots can be concerning  · If you feel like you have heavy bleeding or are having increasing amount of blood clots call your Obstetrician immediately  · If you begin feeling faint upon standing, feeling sick to your stomach, have clammy skin, a really fast heartbeat, have chills, start feeling confused, dizzy, sleepy or weak, or feeling like you're going to faint call your Obstetrician immediately    HYPERTENSION   Preeclampsia or gestational hypertension are types of high blood pressure that only pregnant women can get. It is important for you to be aware of symptoms to seek early intervention and treatment. If you have any of these symptoms immediately call your Obstetrician    · Vision changes or blurred vision   · Severe headache or pain that is unrelieved with medication and will not go away  · Persistent pain in upper abdomen or shoulder   · Increased swelling of face, feet, or hands  · Difficulty breathing or shortness of breath at rest  · Urinating less than usual    URINATION AND BOWEL MOVEMENTS  · Eating  "more fiber (bran cereal, fruits, and vegetables) and drinking plenty of fluids will help to avoid constipation  · Urinary frequency and urgency after childbirth is normal  · If you experience any urinary pain, burning or frequency call your provider    BIRTH CONTROL  · It is possible to become pregnant at any time after delivery and while breastfeeding  · Plan to discuss a method of birth control with your physician at your post delivery follow up visit    POSTPARTUM BLUES  During the first few days after birth, you may experience a sense of the \"blues\" which may include impatience, irritability or even crying. These feelings come and go quickly. However, as many as 1 in 10 women experience emotional symptoms known as postpartum depression.     POSTPARTUM DEPRESSION    May start as early as the second or third day after delivery or take several weeks or months to develop. Symptoms of \"blues\" are present, but are more intense: Crying spells; loss of appetite; feelings of hopelessness or loss of control; fear of touching the baby; over concern or no concern at all about the baby; little or no concern about your own appearance/caring for yourself; and/or inability to sleep or excessive sleeping. Contact your Obstetrician if you are experiencing any of these symptoms     PREVENTING SHAKEN BABY  If you are angry or stressed, PUT THE BABY IN THE CRIB, step away, take some deep breaths, and wait until you are calm to care for the baby. DO NOT SHAKE THE BABY. You are not alone, call a supporter for help.  · [facility] 24/7 crisis call line (789-529-9729) or (1-322.893.5718)  · You can also text them, text \"ANSWER\" (380067)      "

## 2021-12-08 LAB
HSV1 GG IGG SER-ACNC: 39.5 IV
HSV1+2 IGG SER IA-ACNC: >22.4 IV
HSV2 GG IGG SER-ACNC: 7.68 IV

## 2022-01-23 ENCOUNTER — HOSPITAL ENCOUNTER (EMERGENCY)
Facility: MEDICAL CENTER | Age: 27
End: 2022-01-24
Attending: STUDENT IN AN ORGANIZED HEALTH CARE EDUCATION/TRAINING PROGRAM
Payer: COMMERCIAL

## 2022-01-23 DIAGNOSIS — R42 LIGHTHEADEDNESS: ICD-10-CM

## 2022-01-23 DIAGNOSIS — U07.1 COVID-19: ICD-10-CM

## 2022-01-23 PROCEDURE — 85025 COMPLETE CBC W/AUTO DIFF WBC: CPT

## 2022-01-23 PROCEDURE — 83690 ASSAY OF LIPASE: CPT

## 2022-01-23 PROCEDURE — 93005 ELECTROCARDIOGRAM TRACING: CPT | Performed by: STUDENT IN AN ORGANIZED HEALTH CARE EDUCATION/TRAINING PROGRAM

## 2022-01-23 PROCEDURE — 84484 ASSAY OF TROPONIN QUANT: CPT

## 2022-01-23 PROCEDURE — 99283 EMERGENCY DEPT VISIT LOW MDM: CPT

## 2022-01-23 PROCEDURE — 80053 COMPREHEN METABOLIC PANEL: CPT

## 2022-01-23 PROCEDURE — 85379 FIBRIN DEGRADATION QUANT: CPT

## 2022-01-23 RX ORDER — ONDANSETRON 4 MG/1
4 TABLET, ORALLY DISINTEGRATING ORAL ONCE
Status: DISCONTINUED | OUTPATIENT
Start: 2022-01-24 | End: 2022-01-24 | Stop reason: HOSPADM

## 2022-01-24 ENCOUNTER — APPOINTMENT (OUTPATIENT)
Dept: RADIOLOGY | Facility: MEDICAL CENTER | Age: 27
End: 2022-01-24
Attending: STUDENT IN AN ORGANIZED HEALTH CARE EDUCATION/TRAINING PROGRAM
Payer: COMMERCIAL

## 2022-01-24 VITALS
HEIGHT: 64 IN | HEART RATE: 78 BPM | BODY MASS INDEX: 32.93 KG/M2 | WEIGHT: 192.9 LBS | OXYGEN SATURATION: 100 % | TEMPERATURE: 97.9 F | DIASTOLIC BLOOD PRESSURE: 84 MMHG | SYSTOLIC BLOOD PRESSURE: 138 MMHG | RESPIRATION RATE: 16 BRPM

## 2022-01-24 LAB
ALBUMIN SERPL BCP-MCNC: 4.2 G/DL (ref 3.2–4.9)
ALBUMIN/GLOB SERPL: 1.4 G/DL
ALP SERPL-CCNC: 89 U/L (ref 30–99)
ALT SERPL-CCNC: 51 U/L (ref 2–50)
ANION GAP SERPL CALC-SCNC: 12 MMOL/L (ref 7–16)
AST SERPL-CCNC: 32 U/L (ref 12–45)
BASOPHILS # BLD AUTO: 0.6 % (ref 0–1.8)
BASOPHILS # BLD: 0.03 K/UL (ref 0–0.12)
BILIRUB SERPL-MCNC: <0.2 MG/DL (ref 0.1–1.5)
BUN SERPL-MCNC: 13 MG/DL (ref 8–22)
CALCIUM SERPL-MCNC: 8.7 MG/DL (ref 8.4–10.2)
CHLORIDE SERPL-SCNC: 106 MMOL/L (ref 96–112)
CO2 SERPL-SCNC: 23 MMOL/L (ref 20–33)
CREAT SERPL-MCNC: 0.61 MG/DL (ref 0.5–1.4)
D DIMER PPP IA.FEU-MCNC: 0.31 UG/ML (FEU) (ref 0–0.5)
EKG IMPRESSION: NORMAL
EOSINOPHIL # BLD AUTO: 0.25 K/UL (ref 0–0.51)
EOSINOPHIL NFR BLD: 4.6 % (ref 0–6.9)
ERYTHROCYTE [DISTWIDTH] IN BLOOD BY AUTOMATED COUNT: 56.7 FL (ref 35.9–50)
GLOBULIN SER CALC-MCNC: 3 G/DL (ref 1.9–3.5)
GLUCOSE SERPL-MCNC: 100 MG/DL (ref 65–99)
HCT VFR BLD AUTO: 41.5 % (ref 37–47)
HGB BLD-MCNC: 13.4 G/DL (ref 12–16)
IMM GRANULOCYTES # BLD AUTO: 0.02 K/UL (ref 0–0.11)
IMM GRANULOCYTES NFR BLD AUTO: 0.4 % (ref 0–0.9)
LIPASE SERPL-CCNC: 30 U/L (ref 7–58)
LYMPHOCYTES # BLD AUTO: 2.49 K/UL (ref 1–4.8)
LYMPHOCYTES NFR BLD: 45.8 % (ref 22–41)
MCH RBC QN AUTO: 26.3 PG (ref 27–33)
MCHC RBC AUTO-ENTMCNC: 32.3 G/DL (ref 33.6–35)
MCV RBC AUTO: 81.5 FL (ref 81.4–97.8)
MONOCYTES # BLD AUTO: 0.41 K/UL (ref 0–0.85)
MONOCYTES NFR BLD AUTO: 7.5 % (ref 0–13.4)
NEUTROPHILS # BLD AUTO: 2.24 K/UL (ref 2–7.15)
NEUTROPHILS NFR BLD: 41.1 % (ref 44–72)
NRBC # BLD AUTO: 0 K/UL
NRBC BLD-RTO: 0 /100 WBC
PLATELET # BLD AUTO: 175 K/UL (ref 164–446)
PMV BLD AUTO: 10.8 FL (ref 9–12.9)
POTASSIUM SERPL-SCNC: 3.8 MMOL/L (ref 3.6–5.5)
PROT SERPL-MCNC: 7.2 G/DL (ref 6–8.2)
RBC # BLD AUTO: 5.09 M/UL (ref 4.2–5.4)
SODIUM SERPL-SCNC: 141 MMOL/L (ref 135–145)
TROPONIN T SERPL-MCNC: <6 NG/L (ref 6–19)
WBC # BLD AUTO: 5.4 K/UL (ref 4.8–10.8)

## 2022-01-24 PROCEDURE — 700105 HCHG RX REV CODE 258: Performed by: STUDENT IN AN ORGANIZED HEALTH CARE EDUCATION/TRAINING PROGRAM

## 2022-01-24 PROCEDURE — 71045 X-RAY EXAM CHEST 1 VIEW: CPT

## 2022-01-24 RX ORDER — SODIUM CHLORIDE, SODIUM LACTATE, POTASSIUM CHLORIDE, CALCIUM CHLORIDE 600; 310; 30; 20 MG/100ML; MG/100ML; MG/100ML; MG/100ML
1000 INJECTION, SOLUTION INTRAVENOUS ONCE
Status: COMPLETED | OUTPATIENT
Start: 2022-01-24 | End: 2022-01-24

## 2022-01-24 RX ORDER — ONDANSETRON 4 MG/1
4 TABLET, ORALLY DISINTEGRATING ORAL EVERY 6 HOURS PRN
Qty: 10 TABLET | Refills: 0 | Status: SHIPPED | OUTPATIENT
Start: 2022-01-24 | End: 2022-08-26

## 2022-01-24 RX ADMIN — SODIUM CHLORIDE, POTASSIUM CHLORIDE, SODIUM LACTATE AND CALCIUM CHLORIDE 1000 ML: 600; 310; 30; 20 INJECTION, SOLUTION INTRAVENOUS at 00:15

## 2022-01-24 NOTE — ED PROVIDER NOTES
ED Provider Note    CHIEF COMPLAINT  Chief Complaint   Patient presents with   • Dizziness     started 2 days ago    • Nausea     started Tuesday Night    • Diarrhea     Started Tuesday night    • Coronavirus Screening     + home test on Monday        [facility]  Tricia Rees is a 26 y.o. female who presents with new onset dizziness and lightheadedness that started 2 days ago and has been worsening over the last 2 days.  Patient tested positive for Covid with a home test on Monday.  At that time she started with headache, sore throat, fevers.  Began with nausea and diarrhea Tuesday night.  She states most of the symptoms have been improving.  She has had a cough, denies new or worsening shortness of breath.  She states the dizziness and lightheadedness occurs when she stands up, usually several minutes after she has been standing for period of time.  Denies chest pain.  Denies exertional shortness of breath.  Denies numbness or tingling.  States most of her other symptoms have been improving.  Patient gave birth 7 weeks ago at 37 weeks.  No complications with delivery.  Denies fevers or abdominal pain.  Reports normal p.o. intake.  Denies numbness or weakness.    REVIEW OF SYSTEMS  See HPI for further details. All other systems are negative.     PAST MEDICAL HISTORY   has a past medical history of  treated with cerclage at 14 weeks gestation (1/12/2012), Gestational diabetes, HBsAg (hepatitis B surface antigen) positive, currently pregnant (HCC) (1/18/2012), Incompetent cervix in pregnancy (1/12/2012), and Powell cerclage present (1/12/2012).    SOCIAL HISTORY  Social History     Tobacco Use   • Smoking status: Never Smoker   • Smokeless tobacco: Never Used   Vaping Use   • Vaping Use: Never used   Substance and Sexual Activity   • Alcohol use: Not Currently     Comment: Occasionally   • Drug use: Not Currently     Types: Inhaled     Comment: THC for menstral pains >2 years ago 12/2019   • Sexual activity: Not  "Currently     Partners: Male     Comment: unplanned pregnancy FOB involved       SURGICAL HISTORY   has a past surgical history that includes cervical cerclage (  10/2011); cervical cerclage (3/24/2012); primary c section (3/25/2012); and cervical cerclage (7/12/2019).    CURRENT MEDICATIONS  Home Medications     Reviewed by Elena Mahoney R.N. (Registered Nurse) on 01/23/22 at 2329  Med List Status: Not Addressed   Medication Last Dose Status   acetaminophen (TYLENOL) 500 MG Tab  Active   ibuprofen (MOTRIN) 600 MG Tab  Active   Prenatal MV-Min-Fe Fum-FA-DHA (PRENATAL 1 PO)  Active                ALLERGIES  No Known Allergies    PHYSICAL EXAM  VITAL SIGNS: /87   Pulse 78   Temp 36.4 °C (97.6 °F) (Temporal)   Resp 18   Ht 1.626 m (5' 4\")   Wt 87.5 kg (192 lb 14.4 oz)   LMP 01/23/2021   SpO2 100%   BMI 33.11 kg/m²     Pulse ox interpretation: I interpret this pulse ox as normal.  Constitutional: Alert in no apparent distress.  HENT: No signs of trauma, Bilateral external ears normal, Nose normal.   Eyes: Pupils are equal and reactive 3mm zohra, Conjunctiva normal, Non-icteric.   Neck: Normal range of motion, No tenderness, Supple, No stridor.   Lymphatic: No cervical lymphadenopathy noted.   Cardiovascular: Regular rate and rhythm, no murmurs.   Thorax & Lungs: Normal breath sounds, No respiratory distress, No wheezing, No chest tenderness.   Abdomen: Soft, No tenderness, No masses, No pulsatile masses. No peritoneal signs.  Skin: Warm, Dry, No erythema, No rash.   Extremities: Intact distal pulses, No edema, No tenderness, No cyanosis.  Musculoskeletal: Good range of motion in all major joints. No major deformities noted.   Neurologic: 5/5 bilateral upper and lower extremity strength, Grossly intact sensation symmetrically, CN II-XII intact, Normal gait, Normal, fluent speech, GCS 15, normal finger to nose  Psychiatric: Affect normal, Judgment normal, Mood normal.       DIAGNOSTIC STUDIES / " PROCEDURES    LABS  Results for orders placed or performed during the hospital encounter of 01/23/22   CBC WITH DIFFERENTIAL   Result Value Ref Range    WBC 5.4 4.8 - 10.8 K/uL    RBC 5.09 4.20 - 5.40 M/uL    Hemoglobin 13.4 12.0 - 16.0 g/dL    Hematocrit 41.5 37.0 - 47.0 %    MCV 81.5 81.4 - 97.8 fL    MCH 26.3 (L) 27.0 - 33.0 pg    MCHC 32.3 (L) 33.6 - 35.0 g/dL    RDW 56.7 (H) 35.9 - 50.0 fL    Platelet Count 175 164 - 446 K/uL    MPV 10.8 9.0 - 12.9 fL    Neutrophils-Polys 41.10 (L) 44.00 - 72.00 %    Lymphocytes 45.80 (H) 22.00 - 41.00 %    Monocytes 7.50 0.00 - 13.40 %    Eosinophils 4.60 0.00 - 6.90 %    Basophils 0.60 0.00 - 1.80 %    Immature Granulocytes 0.40 0.00 - 0.90 %    Nucleated RBC 0.00 /100 WBC    Neutrophils (Absolute) 2.24 2.00 - 7.15 K/uL    Lymphs (Absolute) 2.49 1.00 - 4.80 K/uL    Monos (Absolute) 0.41 0.00 - 0.85 K/uL    Eos (Absolute) 0.25 0.00 - 0.51 K/uL    Baso (Absolute) 0.03 0.00 - 0.12 K/uL    Immature Granulocytes (abs) 0.02 0.00 - 0.11 K/uL    NRBC (Absolute) 0.00 K/uL   COMP METABOLIC PANEL   Result Value Ref Range    Sodium 141 135 - 145 mmol/L    Potassium 3.8 3.6 - 5.5 mmol/L    Chloride 106 96 - 112 mmol/L    Co2 23 20 - 33 mmol/L    Anion Gap 12.0 7.0 - 16.0    Glucose 100 (H) 65 - 99 mg/dL    Bun 13 8 - 22 mg/dL    Creatinine 0.61 0.50 - 1.40 mg/dL    Calcium 8.7 8.4 - 10.2 mg/dL    AST(SGOT) 32 12 - 45 U/L    ALT(SGPT) 51 (H) 2 - 50 U/L    Alkaline Phosphatase 89 30 - 99 U/L    Total Bilirubin <0.2 0.1 - 1.5 mg/dL    Albumin 4.2 3.2 - 4.9 g/dL    Total Protein 7.2 6.0 - 8.2 g/dL    Globulin 3.0 1.9 - 3.5 g/dL    A-G Ratio 1.4 g/dL   LIPASE   Result Value Ref Range    Lipase 30 7 - 58 U/L   TROPONIN   Result Value Ref Range    Troponin T <6 6 - 19 ng/L   D-DIMER   Result Value Ref Range    D-Dimer Screen 0.31 0.00 - 0.50 ug/mL (FEU)   ESTIMATED GFR   Result Value Ref Range    GFR If African American >60 >60 mL/min/1.73 m 2    GFR If Non African American >60 >60 mL/min/1.73 m  2   EKG   Result Value Ref Range    Report       [facility] Emergency Dept.    Test Date:  2022  Pt Name:    SHERLYN JIMENEZ               Department: [facility]  MRN:        [id number]                      Room:       -ROOM 3  Gender:     Female                       Technician: [id number]  :        [id number]                   Requested By:ASTER LOUIS  Order #:    [id number]                    Reading MD: Aster Louis    Measurements  Intervals                                Axis  Rate:       78                           P:          50  KY:         146                          QRS:        46  QRSD:       100                          T:          19  QT:         403  QTc:        460    Interpretive Statements  Normal sinus rhythm rate of 78, normal axis, slightly prolonged QTC, but  otherwise normal intervals, no ST elevations, depressions, T wave inversions  or  signs of right heart strain  Electronically Signed On 2022 0:09:58 PST by Aster Louis           RADIOLOGY  DX-CHEST-PORTABLE (1 VIEW)   Final Result      No acute cardiopulmonary abnormality.                 COURSE & MEDICAL DECISION MAKING  Pertinent Labs & Imaging studies reviewed. (See chart for details)  12:30 AM  Normal d-dimer, no indication for further PE workup with CTA    1:17 AM  Updated patient on results of work-up, return precautions, plan for follow-up and home care.      Previously healthy 26-year-old female presented with recent Covid diagnosis and increased dizziness and lightheadedness with prolonged standing over the last couple of days.  Patient's vital signs are normal in ED, she is not tachycardic or hypoxic.   She is also normotensive.  Physical exam is benign.  Labs with no evidence of anemia, electrolyte abnormalities.  EKG is normal with no evidence of ischemia, right heart strain significantly prolonged QTC.  Troponin is normal.  Unlikely pericarditis/myocarditis.  iven risk factors for  coagulopathy of recent pregnancy/postpartum status as well as Covid, considered PE more strongly although patient has normal vitals with no hypoxemia or tachycardia.  Did D-dimer which was normal, no indication for further PE work-up with CTA. She has no focal abnormalities on neuro exam, and intermittent nature is not consistent with acute intracranial process such as stroke.   Patient with normal renal function, does not appear significantly dehydrated.  Her chest x-ray has no focal infiltrates or evidence of cardiomegaly.  Unfortunately her symptoms are consistent with Covid, and some patients can have prolonged symptoms following infection.   Advised on conservative management.  Return to emergency department if symptoms worsen.  Remain hydrated, follow-up with primary care doctor closely.  Will prescribe Zofran to help with nausea at home.    @HYDRATION: Based on the patient's presentation of Acute Diarrhea the patient was given IV fluids. IV Hydration was used because oral hydration was not adequate alone. Upon recheck following hydration, the patient was improved.@      The patient will not drink alcohol nor drive with prescribed medications. The patient will return for worsening symptoms and is stable at the time of discharge. The patient verbalizes understanding and will comply.    FINAL IMPRESSION  1. COVID-19     2. Lightheadedness           Electronically signed by: Aster Hou M.D., 1/23/2022 11:56 PM

## 2022-01-24 NOTE — DISCHARGE INSTRUCTIONS
Continue to stay hydrated, drink plenty of water and take it easy at home.  Given your symptoms be cautious of long periods of standing.      Follow-up with your primary care doctor in the next 3 to 5 days for recheck.  Return to the emergency department if you pass out, you develop shortness of breath, difficulty breathing, severe chest pain or other concerns.

## 2022-08-26 ENCOUNTER — PRE-ADMISSION TESTING (OUTPATIENT)
Dept: ADMISSIONS | Facility: MEDICAL CENTER | Age: 27
End: 2022-08-26
Attending: OBSTETRICS & GYNECOLOGY
Payer: COMMERCIAL

## 2022-08-29 ENCOUNTER — PRE-ADMISSION TESTING (OUTPATIENT)
Dept: ADMISSIONS | Facility: MEDICAL CENTER | Age: 27
End: 2022-08-29
Attending: OBSTETRICS & GYNECOLOGY
Payer: COMMERCIAL

## 2022-08-29 DIAGNOSIS — Z01.812 PRE-OPERATIVE LABORATORY EXAMINATION: ICD-10-CM

## 2022-08-29 LAB
ABO GROUP BLD: NORMAL
ALBUMIN SERPL BCP-MCNC: 4.2 G/DL (ref 3.2–4.9)
ALBUMIN/GLOB SERPL: 1.4 G/DL
ALP SERPL-CCNC: 84 U/L (ref 30–99)
ALT SERPL-CCNC: 13 U/L (ref 2–50)
ANION GAP SERPL CALC-SCNC: 11 MMOL/L (ref 7–16)
APPEARANCE UR: CLEAR
AST SERPL-CCNC: 10 U/L (ref 12–45)
B-HCG SERPL-ACNC: <1 MIU/ML (ref 0–5)
BACTERIA #/AREA URNS HPF: ABNORMAL /HPF
BASOPHILS # BLD AUTO: 0.9 % (ref 0–1.8)
BASOPHILS # BLD: 0.06 K/UL (ref 0–0.12)
BILIRUB SERPL-MCNC: 0.4 MG/DL (ref 0.1–1.5)
BILIRUB UR QL STRIP.AUTO: NEGATIVE
BLD GP AB SCN SERPL QL: NORMAL
BUN SERPL-MCNC: 12 MG/DL (ref 8–22)
CALCIUM SERPL-MCNC: 8.8 MG/DL (ref 8.5–10.5)
CHLORIDE SERPL-SCNC: 106 MMOL/L (ref 96–112)
CO2 SERPL-SCNC: 24 MMOL/L (ref 20–33)
COLOR UR: YELLOW
CREAT SERPL-MCNC: 0.43 MG/DL (ref 0.5–1.4)
EOSINOPHIL # BLD AUTO: 0.16 K/UL (ref 0–0.51)
EOSINOPHIL NFR BLD: 2.3 % (ref 0–6.9)
EPI CELLS #/AREA URNS HPF: ABNORMAL /HPF
ERYTHROCYTE [DISTWIDTH] IN BLOOD BY AUTOMATED COUNT: 44.7 FL (ref 35.9–50)
EST. AVERAGE GLUCOSE BLD GHB EST-MCNC: 94 MG/DL
GFR SERPLBLD CREATININE-BSD FMLA CKD-EPI: 136 ML/MIN/1.73 M 2
GLOBULIN SER CALC-MCNC: 3 G/DL (ref 1.9–3.5)
GLUCOSE SERPL-MCNC: 85 MG/DL (ref 65–99)
GLUCOSE UR STRIP.AUTO-MCNC: NEGATIVE MG/DL
HBA1C MFR BLD: 4.9 % (ref 4–5.6)
HCT VFR BLD AUTO: 40 % (ref 37–47)
HGB BLD-MCNC: 13.8 G/DL (ref 12–16)
HYALINE CASTS #/AREA URNS LPF: ABNORMAL /LPF
IMM GRANULOCYTES # BLD AUTO: 0.02 K/UL (ref 0–0.11)
IMM GRANULOCYTES NFR BLD AUTO: 0.3 % (ref 0–0.9)
KETONES UR STRIP.AUTO-MCNC: NEGATIVE MG/DL
LEUKOCYTE ESTERASE UR QL STRIP.AUTO: ABNORMAL
LYMPHOCYTES # BLD AUTO: 2.46 K/UL (ref 1–4.8)
LYMPHOCYTES NFR BLD: 35 % (ref 22–41)
MCH RBC QN AUTO: 30.8 PG (ref 27–33)
MCHC RBC AUTO-ENTMCNC: 34.5 G/DL (ref 33.6–35)
MCV RBC AUTO: 89.3 FL (ref 81.4–97.8)
MICRO URNS: ABNORMAL
MONOCYTES # BLD AUTO: 0.4 K/UL (ref 0–0.85)
MONOCYTES NFR BLD AUTO: 5.7 % (ref 0–13.4)
NEUTROPHILS # BLD AUTO: 3.93 K/UL (ref 2–7.15)
NEUTROPHILS NFR BLD: 55.8 % (ref 44–72)
NITRITE UR QL STRIP.AUTO: NEGATIVE
NRBC # BLD AUTO: 0 K/UL
NRBC BLD-RTO: 0 /100 WBC
PH UR STRIP.AUTO: 6.5 [PH] (ref 5–8)
PLATELET # BLD AUTO: 284 K/UL (ref 164–446)
PMV BLD AUTO: 10.6 FL (ref 9–12.9)
POTASSIUM SERPL-SCNC: 3.8 MMOL/L (ref 3.6–5.5)
PROT SERPL-MCNC: 7.2 G/DL (ref 6–8.2)
PROT UR QL STRIP: NEGATIVE MG/DL
RBC # BLD AUTO: 4.48 M/UL (ref 4.2–5.4)
RBC # URNS HPF: ABNORMAL /HPF
RBC UR QL AUTO: ABNORMAL
RH BLD: NORMAL
SODIUM SERPL-SCNC: 141 MMOL/L (ref 135–145)
SP GR UR STRIP.AUTO: 1.02
UROBILINOGEN UR STRIP.AUTO-MCNC: 0.2 MG/DL
WBC # BLD AUTO: 7 K/UL (ref 4.8–10.8)
WBC #/AREA URNS HPF: ABNORMAL /HPF

## 2022-08-29 PROCEDURE — 81001 URINALYSIS AUTO W/SCOPE: CPT

## 2022-08-29 PROCEDURE — 85025 COMPLETE CBC W/AUTO DIFF WBC: CPT

## 2022-08-29 PROCEDURE — 86850 RBC ANTIBODY SCREEN: CPT

## 2022-08-29 PROCEDURE — 80053 COMPREHEN METABOLIC PANEL: CPT

## 2022-08-29 PROCEDURE — 86900 BLOOD TYPING SEROLOGIC ABO: CPT

## 2022-08-29 PROCEDURE — 83036 HEMOGLOBIN GLYCOSYLATED A1C: CPT

## 2022-08-29 PROCEDURE — 84702 CHORIONIC GONADOTROPIN TEST: CPT

## 2022-08-29 PROCEDURE — 36415 COLL VENOUS BLD VENIPUNCTURE: CPT

## 2022-08-29 PROCEDURE — 86901 BLOOD TYPING SEROLOGIC RH(D): CPT

## 2022-08-30 ENCOUNTER — ANESTHESIA (OUTPATIENT)
Dept: SURGERY | Facility: MEDICAL CENTER | Age: 27
End: 2022-08-30
Payer: COMMERCIAL

## 2022-08-30 ENCOUNTER — ANESTHESIA EVENT (OUTPATIENT)
Dept: SURGERY | Facility: MEDICAL CENTER | Age: 27
End: 2022-08-30
Payer: COMMERCIAL

## 2022-08-30 ENCOUNTER — HOSPITAL ENCOUNTER (OUTPATIENT)
Facility: MEDICAL CENTER | Age: 27
End: 2022-08-30
Attending: OBSTETRICS & GYNECOLOGY | Admitting: OBSTETRICS & GYNECOLOGY
Payer: COMMERCIAL

## 2022-08-30 VITALS
HEART RATE: 63 BPM | RESPIRATION RATE: 18 BRPM | WEIGHT: 177.47 LBS | OXYGEN SATURATION: 98 % | HEIGHT: 64 IN | BODY MASS INDEX: 30.3 KG/M2 | DIASTOLIC BLOOD PRESSURE: 71 MMHG | SYSTOLIC BLOOD PRESSURE: 119 MMHG | TEMPERATURE: 97.3 F

## 2022-08-30 DIAGNOSIS — G89.18 POSTOPERATIVE PAIN: Primary | ICD-10-CM

## 2022-08-30 PROCEDURE — 160002 HCHG RECOVERY MINUTES (STAT): Performed by: OBSTETRICS & GYNECOLOGY

## 2022-08-30 PROCEDURE — A9270 NON-COVERED ITEM OR SERVICE: HCPCS | Performed by: ANESTHESIOLOGY

## 2022-08-30 PROCEDURE — 160048 HCHG OR STATISTICAL LEVEL 1-5: Performed by: OBSTETRICS & GYNECOLOGY

## 2022-08-30 PROCEDURE — 160046 HCHG PACU - 1ST 60 MINS PHASE II: Performed by: OBSTETRICS & GYNECOLOGY

## 2022-08-30 PROCEDURE — 110371 HCHG SHELL REV 272: Performed by: OBSTETRICS & GYNECOLOGY

## 2022-08-30 PROCEDURE — 700111 HCHG RX REV CODE 636 W/ 250 OVERRIDE (IP): Performed by: ANESTHESIOLOGY

## 2022-08-30 PROCEDURE — 160029 HCHG SURGERY MINUTES - 1ST 30 MINS LEVEL 4: Performed by: OBSTETRICS & GYNECOLOGY

## 2022-08-30 PROCEDURE — 160025 RECOVERY II MINUTES (STATS): Performed by: OBSTETRICS & GYNECOLOGY

## 2022-08-30 PROCEDURE — 160041 HCHG SURGERY MINUTES - EA ADDL 1 MIN LEVEL 4: Performed by: OBSTETRICS & GYNECOLOGY

## 2022-08-30 PROCEDURE — 160036 HCHG PACU - EA ADDL 30 MINS PHASE I: Performed by: OBSTETRICS & GYNECOLOGY

## 2022-08-30 PROCEDURE — 00851 ANES IPER PX TUBAL LIGATION: CPT | Performed by: ANESTHESIOLOGY

## 2022-08-30 PROCEDURE — 700102 HCHG RX REV CODE 250 W/ 637 OVERRIDE(OP): Performed by: ANESTHESIOLOGY

## 2022-08-30 PROCEDURE — 160035 HCHG PACU - 1ST 60 MINS PHASE I: Performed by: OBSTETRICS & GYNECOLOGY

## 2022-08-30 PROCEDURE — 160009 HCHG ANES TIME/MIN: Performed by: OBSTETRICS & GYNECOLOGY

## 2022-08-30 PROCEDURE — 700101 HCHG RX REV CODE 250: Performed by: ANESTHESIOLOGY

## 2022-08-30 PROCEDURE — 700105 HCHG RX REV CODE 258: Performed by: OBSTETRICS & GYNECOLOGY

## 2022-08-30 PROCEDURE — 700101 HCHG RX REV CODE 250: Performed by: OBSTETRICS & GYNECOLOGY

## 2022-08-30 RX ORDER — ALBUTEROL SULFATE 2.5 MG/3ML
2.5 SOLUTION RESPIRATORY (INHALATION)
Status: DISCONTINUED | OUTPATIENT
Start: 2022-08-30 | End: 2022-08-30 | Stop reason: HOSPADM

## 2022-08-30 RX ORDER — OXYCODONE HCL 5 MG/5 ML
5 SOLUTION, ORAL ORAL
Status: COMPLETED | OUTPATIENT
Start: 2022-08-30 | End: 2022-08-30

## 2022-08-30 RX ORDER — EPINEPHRINE 1 MG/ML(1)
AMPUL (ML) INJECTION
Status: DISCONTINUED
Start: 2022-08-30 | End: 2022-08-30 | Stop reason: HOSPADM

## 2022-08-30 RX ORDER — OXYCODONE HYDROCHLORIDE AND ACETAMINOPHEN 5; 325 MG/1; MG/1
1 TABLET ORAL EVERY 4 HOURS PRN
Qty: 12 TABLET | Refills: 0 | Status: SHIPPED | OUTPATIENT
Start: 2022-08-30 | End: 2022-09-01

## 2022-08-30 RX ORDER — SODIUM CHLORIDE, SODIUM LACTATE, POTASSIUM CHLORIDE, CALCIUM CHLORIDE 600; 310; 30; 20 MG/100ML; MG/100ML; MG/100ML; MG/100ML
INJECTION, SOLUTION INTRAVENOUS CONTINUOUS
Status: ACTIVE | OUTPATIENT
Start: 2022-08-30 | End: 2022-08-30

## 2022-08-30 RX ORDER — DEXAMETHASONE SODIUM PHOSPHATE 4 MG/ML
INJECTION, SOLUTION INTRA-ARTICULAR; INTRALESIONAL; INTRAMUSCULAR; INTRAVENOUS; SOFT TISSUE PRN
Status: DISCONTINUED | OUTPATIENT
Start: 2022-08-30 | End: 2022-08-30 | Stop reason: SURG

## 2022-08-30 RX ORDER — DIPHENHYDRAMINE HYDROCHLORIDE 50 MG/ML
12.5 INJECTION INTRAMUSCULAR; INTRAVENOUS
Status: DISCONTINUED | OUTPATIENT
Start: 2022-08-30 | End: 2022-08-30 | Stop reason: HOSPADM

## 2022-08-30 RX ORDER — KETAMINE HYDROCHLORIDE 50 MG/ML
INJECTION, SOLUTION INTRAMUSCULAR; INTRAVENOUS PRN
Status: DISCONTINUED | OUTPATIENT
Start: 2022-08-30 | End: 2022-08-30 | Stop reason: SURG

## 2022-08-30 RX ORDER — OXYCODONE HCL 5 MG/5 ML
10 SOLUTION, ORAL ORAL
Status: COMPLETED | OUTPATIENT
Start: 2022-08-30 | End: 2022-08-30

## 2022-08-30 RX ORDER — HYDRALAZINE HYDROCHLORIDE 20 MG/ML
5 INJECTION INTRAMUSCULAR; INTRAVENOUS
Status: DISCONTINUED | OUTPATIENT
Start: 2022-08-30 | End: 2022-08-30 | Stop reason: HOSPADM

## 2022-08-30 RX ORDER — BUPIVACAINE HYDROCHLORIDE AND EPINEPHRINE 2.5; 5 MG/ML; UG/ML
INJECTION, SOLUTION EPIDURAL; INFILTRATION; INTRACAUDAL; PERINEURAL
Status: DISCONTINUED | OUTPATIENT
Start: 2022-08-30 | End: 2022-08-30 | Stop reason: HOSPADM

## 2022-08-30 RX ORDER — LIDOCAINE HYDROCHLORIDE 40 MG/ML
SOLUTION TOPICAL PRN
Status: DISCONTINUED | OUTPATIENT
Start: 2022-08-30 | End: 2022-08-30 | Stop reason: SURG

## 2022-08-30 RX ORDER — HALOPERIDOL 5 MG/ML
1 INJECTION INTRAMUSCULAR
Status: DISCONTINUED | OUTPATIENT
Start: 2022-08-30 | End: 2022-08-30 | Stop reason: HOSPADM

## 2022-08-30 RX ORDER — LABETALOL HYDROCHLORIDE 5 MG/ML
5 INJECTION, SOLUTION INTRAVENOUS
Status: DISCONTINUED | OUTPATIENT
Start: 2022-08-30 | End: 2022-08-30 | Stop reason: HOSPADM

## 2022-08-30 RX ORDER — KETOROLAC TROMETHAMINE 30 MG/ML
30 INJECTION, SOLUTION INTRAMUSCULAR; INTRAVENOUS ONCE
Status: COMPLETED | OUTPATIENT
Start: 2022-08-30 | End: 2022-08-30

## 2022-08-30 RX ORDER — HYDROMORPHONE HYDROCHLORIDE 1 MG/ML
0.4 INJECTION, SOLUTION INTRAMUSCULAR; INTRAVENOUS; SUBCUTANEOUS
Status: DISCONTINUED | OUTPATIENT
Start: 2022-08-30 | End: 2022-08-30 | Stop reason: HOSPADM

## 2022-08-30 RX ORDER — SODIUM CHLORIDE, SODIUM LACTATE, POTASSIUM CHLORIDE, CALCIUM CHLORIDE 600; 310; 30; 20 MG/100ML; MG/100ML; MG/100ML; MG/100ML
INJECTION, SOLUTION INTRAVENOUS CONTINUOUS
Status: DISCONTINUED | OUTPATIENT
Start: 2022-08-30 | End: 2022-08-30 | Stop reason: HOSPADM

## 2022-08-30 RX ORDER — HYDROMORPHONE HYDROCHLORIDE 1 MG/ML
0.5 INJECTION, SOLUTION INTRAMUSCULAR; INTRAVENOUS; SUBCUTANEOUS
Status: DISCONTINUED | OUTPATIENT
Start: 2022-08-30 | End: 2022-08-30 | Stop reason: HOSPADM

## 2022-08-30 RX ORDER — LIDOCAINE HYDROCHLORIDE 20 MG/ML
INJECTION, SOLUTION EPIDURAL; INFILTRATION; INTRACAUDAL; PERINEURAL PRN
Status: DISCONTINUED | OUTPATIENT
Start: 2022-08-30 | End: 2022-08-30 | Stop reason: SURG

## 2022-08-30 RX ORDER — BUPIVACAINE HYDROCHLORIDE 2.5 MG/ML
INJECTION, SOLUTION EPIDURAL; INFILTRATION; INTRACAUDAL
Status: DISCONTINUED
Start: 2022-08-30 | End: 2022-08-30 | Stop reason: HOSPADM

## 2022-08-30 RX ORDER — MIDAZOLAM HYDROCHLORIDE 1 MG/ML
INJECTION INTRAMUSCULAR; INTRAVENOUS PRN
Status: DISCONTINUED | OUTPATIENT
Start: 2022-08-30 | End: 2022-08-30 | Stop reason: SURG

## 2022-08-30 RX ORDER — HYDROMORPHONE HYDROCHLORIDE 1 MG/ML
0.2 INJECTION, SOLUTION INTRAMUSCULAR; INTRAVENOUS; SUBCUTANEOUS
Status: DISCONTINUED | OUTPATIENT
Start: 2022-08-30 | End: 2022-08-30 | Stop reason: HOSPADM

## 2022-08-30 RX ORDER — CEFAZOLIN SODIUM 1 G/3ML
INJECTION, POWDER, FOR SOLUTION INTRAMUSCULAR; INTRAVENOUS PRN
Status: DISCONTINUED | OUTPATIENT
Start: 2022-08-30 | End: 2022-08-30 | Stop reason: SURG

## 2022-08-30 RX ADMIN — HALOPERIDOL LACTATE 1 MG: 5 INJECTION, SOLUTION INTRAMUSCULAR at 13:17

## 2022-08-30 RX ADMIN — MIDAZOLAM HYDROCHLORIDE 2 MG: 1 INJECTION, SOLUTION INTRAMUSCULAR; INTRAVENOUS at 12:00

## 2022-08-30 RX ADMIN — LIDOCAINE HYDROCHLORIDE 60 MG: 20 INJECTION, SOLUTION EPIDURAL; INFILTRATION; INTRACAUDAL at 12:05

## 2022-08-30 RX ADMIN — KETAMINE HYDROCHLORIDE 50 MG: 50 INJECTION INTRAMUSCULAR; INTRAVENOUS at 12:05

## 2022-08-30 RX ADMIN — ROCURONIUM BROMIDE 50 MG: 10 INJECTION, SOLUTION INTRAVENOUS at 12:06

## 2022-08-30 RX ADMIN — SUGAMMADEX 200 MG: 100 INJECTION, SOLUTION INTRAVENOUS at 12:47

## 2022-08-30 RX ADMIN — FENTANYL CITRATE 50 MCG: 50 INJECTION, SOLUTION INTRAMUSCULAR; INTRAVENOUS at 13:23

## 2022-08-30 RX ADMIN — OXYCODONE HYDROCHLORIDE 10 MG: 5 SOLUTION ORAL at 13:27

## 2022-08-30 RX ADMIN — CEFAZOLIN 2 G: 330 INJECTION, POWDER, FOR SOLUTION INTRAMUSCULAR; INTRAVENOUS at 12:10

## 2022-08-30 RX ADMIN — KETOROLAC TROMETHAMINE 30 MG: 30 INJECTION, SOLUTION INTRAMUSCULAR; INTRAVENOUS at 13:14

## 2022-08-30 RX ADMIN — LIDOCAINE HYDROCHLORIDE 4 ML: 40 SOLUTION TOPICAL at 12:06

## 2022-08-30 RX ADMIN — DEXAMETHASONE SODIUM PHOSPHATE 10 MG: 4 INJECTION, SOLUTION INTRA-ARTICULAR; INTRALESIONAL; INTRAMUSCULAR; INTRAVENOUS; SOFT TISSUE at 12:10

## 2022-08-30 RX ADMIN — FENTANYL CITRATE 50 MCG: 50 INJECTION, SOLUTION INTRAMUSCULAR; INTRAVENOUS at 12:29

## 2022-08-30 RX ADMIN — PROPOFOL 150 MG: 10 INJECTION, EMULSION INTRAVENOUS at 12:05

## 2022-08-30 RX ADMIN — FENTANYL CITRATE 50 MCG: 50 INJECTION, SOLUTION INTRAMUSCULAR; INTRAVENOUS at 12:05

## 2022-08-30 RX ADMIN — SODIUM CHLORIDE, POTASSIUM CHLORIDE, SODIUM LACTATE AND CALCIUM CHLORIDE: 600; 310; 30; 20 INJECTION, SOLUTION INTRAVENOUS at 11:09

## 2022-08-30 ASSESSMENT — PAIN DESCRIPTION - PAIN TYPE
TYPE: SURGICAL PAIN

## 2022-08-30 ASSESSMENT — FIBROSIS 4 INDEX: FIB4 SCORE: 0.26

## 2022-08-30 NOTE — ANESTHESIA PREPROCEDURE EVALUATION
Case: [id number] Date/Time: 08/30/22 1145    Procedure: LAPAROSCOPIC BILATERAL TUBAL LIGATION    Pre-op diagnosis: DESIRES PERMANENT STERILIZATION    Location: [facility] ROOM 24 / SURGERY SAME DAY [facility]    Surgeons: Corinne E Capurro, M.D.          Relevant Problems   Other   (positive)  treated with cerclage at 14 weeks gestation     Anes H&P:  PAST MEDICAL HISTORY:   26 y.o. female who presents for Procedure(s):  LAPAROSCOPIC BILATERAL TUBAL LIGATION.  She has current and past medical problems significant for:    Past Medical History:   Diagnosis Date   •  treated with cerclage at 14 weeks gestation 1/12/2012   • Gestational diabetes    • HBsAg (hepatitis B surface antigen) positive, currently pregnant (HCC) 1/18/2012   • Incompetent cervix in pregnancy 1/12/2012   • Powell cerclage present 1/12/2012       SMOKING/ALCOHOL/RECREATIONAL DRUG USE:  Social History     Tobacco Use   • Smoking status: Never   • Smokeless tobacco: Never   Vaping Use   • Vaping Use: Former   • Quit date: 1/1/2019   • Substances: CBD   Substance Use Topics   • Alcohol use: Not Currently     Comment: Occasionally. 8/26/22: Quit in 2019   • Drug use: Not Currently     Types: Inhaled     Comment: THC for menstral pains >2 years ago 12/2019.  Used to smoke Marijuana, quit in 2019     Social History     Substance and Sexual Activity   Drug Use Not Currently   • Types: Inhaled    Comment: THC for menstral pains >2 years ago 12/2019.  Used to smoke Marijuana, quit in 2019       PAST SURGICAL HISTORY:  Past Surgical History:   Procedure Laterality Date   • CERVICAL CERCLAGE  2021   • CERVICAL CERCLAGE  07/12/2019    Procedure: CERCLAGE, CERVIX;  Surgeon: Kelvin Mcdowell M.D.;  Location: LABOR AND DELIVERY;  Service: Labor and Delivery   • CERVICAL CERCLAGE  2015   • PRIMARY C SECTION  03/25/2012    Performed by CHATO CONDE at LABOR AND DELIVERY   • CERVICAL CERCLAGE  03/24/2012    Performed by PRAKASH RICO at LABOR AND DELIVERY   • CERVICAL  CERCLAGE  10/01/2011       ALLERGIES:   No Known Allergies    MEDICATIONS:  No current facility-administered medications on file prior to encounter.     Current Outpatient Medications on File Prior to Encounter   Medication Sig Dispense Refill   • acetaminophen (TYLENOL) 500 MG Tab Take 2 Tablets by mouth every 6 hours as needed. 30 Tablet 0       LABS:  Lab Results   Component Value Date/Time    HEMOGLOBIN 13.8 08/29/2022 0938    HEMATOCRIT 40.0 08/29/2022 0938    WBC 7.0 08/29/2022 0938     Lab Results   Component Value Date/Time    SODIUM 141 08/29/2022 0938    POTASSIUM 3.8 08/29/2022 0938    CHLORIDE 106 08/29/2022 0938    CO2 24 08/29/2022 0938    GLUCOSE 85 08/29/2022 0938    BUN 12 08/29/2022 0938    CALCIUM 8.8 08/29/2022 0938         PREVIOUS ANESTHETICS: See EMR  __________________________________________      Physical Exam    Airway   Mallampati: I  TM distance: >3 FB  Neck ROM: full       Cardiovascular - normal exam  Rhythm: regular  Rate: normal  (-) murmur     Dental - normal exam           Pulmonary - normal exam  Breath sounds clear to auscultation     Abdominal    Neurological - normal exam                 Anesthesia Plan    ASA 1       Plan - general       Airway plan will be ETT          Induction: intravenous    Postoperative Plan: Postoperative administration of opioids is intended.    Pertinent diagnostic labs and testing reviewed    Informed Consent:    Anesthetic plan and risks discussed with patient.    Use of blood products discussed with: patient whom consented to blood products.

## 2022-08-30 NOTE — H&P
DATE OF ADMISSION:  2022     CHIEF COMPLAINT:  Multiparous, desires permanent sterilization.     HISTORY OF PRESENT ILLNESS:  The patient is a 26-year-old female who is a    4, para 4.  She desires to undergo permanent sterilization.     PAST MEDICAL HISTORY:  Includes prior gestational diabetes.     PAST SURGICAL HISTORY:  Includes multiple cerclages for her prior pregnancies   and  section x3.     SOCIAL HISTORY:  Negative.     ALLERGIES:  The patient has no known drug allergies.     CURRENT MEDICATIONS:  Include oral contraceptive pill.     PHYSICAL EXAMINATION:   VITAL SIGNS:  Stable.  CARDIOVASCULAR:  Regular rate and rhythm.  LUNGS:  Clear.  ABDOMEN:  Nontender.  PELVIC:  Deferred.  EXTREMITIES:  Show no edema.     PREOPERATIVE LABORATORY DATA:   Includes a normal hemoglobin at 13.8,   platelets are 284.  Her urine is clean.  Her pregnancy test is negative.     ASSESSMENT AND PLAN:  This is a 26-year-old female  4, para 4, who   desires to undergo permanent sterilization.  Plan is to proceed with a   laparoscopic bilateral tubal ligation by fulguration.  I discussed with her   risks of the procedure to include pain, bleeding, infection, damage to   internal organs, anesthetic risks, HIV and hepatitis in the event that a   transfusion is necessary.  She also understands this is a permanent procedure   for which we do not perform a reversal procedure.  She also understands that   there is an increased risk for a future ectopic pregnancy.  She understands   all of the above risk factors and she has signed the proper consent forms and   her questions have been answered.        ______________________________  Corinne E. Capurro, MD CEC/SUB    DD:  2022 08:30  DT:  2022 09:34    Job#:  [id number]

## 2022-08-30 NOTE — ANESTHESIA PROCEDURE NOTES
Airway    Date/Time: 8/30/2022 12:07 PM  Performed by: Aguilar Mallory M.D.  Authorized by: Aguilar Mallory M.D.     Location:  OR  Urgency:  Elective  Difficult Airway: No    Indications for Airway Management:  Anesthesia      Spontaneous Ventilation: absent    Sedation Level:  Deep  Preoxygenated: Yes    Patient Position:  Sniffing  Mask Difficulty Assessment:  1 - vent by mask  Final Airway Type:  Endotracheal airway  Final Endotracheal Airway:  ETT  Cuffed: Yes    Technique Used for Successful ETT Placement:  Direct laryngoscopy    Insertion Site:  Oral  Blade Type:  Ness  Laryngoscope Blade/Videolaryngoscope Blade Size:  2  ETT Size (mm):  7.0  Measured from:  Teeth  ETT to Teeth (cm):  23  Placement Verified by: auscultation and capnometry    Cormack-Lehane Classification:  Grade I - full view of glottis  Number of Attempts at Approach:  1

## 2022-08-30 NOTE — DISCHARGE INSTRUCTIONS
What to Expect Post Anesthesia    Rest and take it easy for the first 24 hours.  A responsible adult is recommended to remain with you during that time.  It is normal to feel sleepy.  We encourage you to not do anything that requires balance, judgment or coordination.    FOR 24 HOURS DO NOT:  Drive, operate machinery or run household appliances.  Drink beer or alcoholic beverages.  Make important decisions or sign legal documents.    To avoid nausea, slowly advance diet as tolerated, avoiding spicy or greasy foods for the first day.  Add more substantial food to your diet according to your provider's instructions.  INCREASE FLUIDS AND FIBER TO AVOID CONSTIPATION.    MILD FLU-LIKE SYMPTOMS ARE NORMAL.  YOU MAY EXPERIENCE GENERALIZED MUSCLE ACHES, THROAT IRRITATION, HEADACHE AND/OR SOME NAUSEA.    If any questions arise, call your provider. Dr. Wade's telephone #: [phone]  If your provider is not available, please feel free to call the Surgical Center at [phone].    MEDICATIONS: Resume taking daily medication.  Take prescribed pain medication with food.  If no medication is prescribed, you may take non-aspirin pain medication if needed.  PAIN MEDICATION CAN BE VERY CONSTIPATING.  Take a stool softener or laxative such as senokot, pericolace, or milk of magnesia if needed.    Last pain medication given at 1:30pm (10mg oral oxycodone given). Ok to take your prescription pain medication if needed at 5:30pm.  Toradol (NSAID similar to ibuprofen/Motrin) given at 1:15pm. You may take ibuprofen if needed at 7:15pm.

## 2022-08-30 NOTE — OR NURSING
1254- Pt to PACU from OR. Report from anesthesia and OR RN. On 6L O2 via mask. Respirations even and unlabored. VSS.  Abdominal lap site with bandaid, CDI. Peripad in place with no bleeding noted. Verbal order obtained for 30mg IV toradol from Dr. Mallory    1314- 30mg IV Toradol given for 8/10 pain    1317- 1mg IV haldol given    1323- pt medication per MAR for pain    1335- Pt's , Aldo, called and updated.    1419- Pt expresses readiness for discharge. Transferred to phase II via [facility]. Pt up to bathroom prior to arriving in phase II. Report to Debbie LANGLEY.

## 2022-08-30 NOTE — ANESTHESIA TIME REPORT
Anesthesia Start and Stop Event Times     Date Time Event    8/30/2022 1151 Ready for Procedure     1200 Anesthesia Start     1302 Anesthesia Stop        Responsible Staff  08/30/22    Name Role Begin End    Aguilar Mallory M.D. Anesth 1200 1302        Overtime Reason:  no overtime (within assigned shift)    Comments:

## 2022-08-31 NOTE — OP REPORT
DATE OF SERVICE:  08/30/2022     PREOPERATIVE DIAGNOSIS:  Multiparous, desires permanent sterilization.     POSTOPERATIVE DIAGNOSIS:  Multiparous, desires permanent sterilization.     PROCEDURE:  Laparoscopic bilateral tubal ligation by fulguration.     PRIMARY SURGEON:  Corinne E. Capurro, MD     ASSISTANT:  None.     ANESTHESIA:  General anesthesia.     TOTAL ESTIMATED BLOOD LOSS:  Less than 20 mL.     COMPLICATIONS:  None.     PATHOLOGY:  None.     FINDINGS:  A normal-appearing uterus with bilateral fallopian tubes and   ovaries.     DESCRIPTION OF PROCEDURE:  After the patient was taken to the operating room   and her general anesthesia was found to be adequate, she was then prepped and   draped in the usual dorsal supine position using Jeovanny stirrups.  [facility] [place]   speculum was placed into the patient's vagina.  The anterior lip of the cervix   was grasped with a single tooth tenaculum.  An acorn manipulator was   introduced into the cervix as a means to manipulate the uterus during the   procedure.  Speculum was then removed.  Gloves changed.     Attention was then turned to the patient's abdomen.  Marcaine with epinephrine   was injected infraumbilically.  Stab wound was made with a knife.  A Veress   needle was inserted while tenting the abdominal wall.  Confirmation made with   drop in syringe.  Veress needle was then removed and the incision was extended   using the knife.  A 10 mm trocar was then introduced into this cavity   uncomplicated.  Scope was then entered with findings as noted above.  The   patient's right fallopian tube was then grasped and it was cauterized almost   the entire length of the fallopian tube with good hemostasis assured.  The   same procedure was performed on the patient's left fallopian tube with good   hemostasis assured.  A trocar and camera were then removed from the umbilicus.    Umbilical incision was repaired in a running fashion using 4-0 Vicryl.  It   was then covered with  benzoin, Steri-Strips and a Band-Aid.     Attention was then turned back to the patient's vagina.  The single tooth   tenaculum and acorn were removed from the patient's vagina and cervix and the   anterior lip of the cervix was hemostatic.  The patient returned to recovery   in stable condition.  Sponge, lap and needle counts were correct x3 at the end   of the procedure.        ______________________________  Corinne E. Capurro, MD CEC/SASHA/RENALDO    DD:  08/30/2022 16:45  DT:  08/30/2022 19:13    Job#:  [id number]

## 2022-08-31 NOTE — ANESTHESIA POSTPROCEDURE EVALUATION
Patient: Tricia Rees    Procedure Summary     Date: 08/30/22 Room / Location: [facility] ROOM 21 / SURGERY SAME DAY [facility]    Anesthesia Start: 1200 Anesthesia Stop: 1302    Procedure: LAPAROSCOPIC BILATERAL TUBAL LIGATION (Bilateral: Abdomen) Diagnosis: (DESIRES PERMANENT STERILIZATION)    Surgeons: Corinne E Capurro, M.D. Responsible Provider: Aguilar Mallory M.D.    Anesthesia Type: general ASA Status: 1          Final Anesthesia Type: general  Last vitals  BP   Blood Pressure: 119/71    Temp   36.3 °C (97.3 °F)    Pulse   63   Resp   18    SpO2   98 %      Anesthesia Post Evaluation    Patient location during evaluation: PACU  Patient participation: complete - patient participated  Level of consciousness: sleepy but conscious    Airway patency: patent  Anesthetic complications: no  Cardiovascular status: hemodynamically stable  Respiratory status: acceptable  Hydration status: balanced    PONV: none          No notable events documented.     Nurse Pain Score: 5 (NPRS)

## 2023-11-27 ENCOUNTER — HOSPITAL ENCOUNTER (EMERGENCY)
Facility: MEDICAL CENTER | Age: 28
End: 2023-11-27
Attending: EMERGENCY MEDICINE
Payer: COMMERCIAL

## 2023-11-27 ENCOUNTER — APPOINTMENT (OUTPATIENT)
Dept: RADIOLOGY | Facility: MEDICAL CENTER | Age: 28
End: 2023-11-27
Attending: EMERGENCY MEDICINE
Payer: COMMERCIAL

## 2023-11-27 VITALS
RESPIRATION RATE: 19 BRPM | SYSTOLIC BLOOD PRESSURE: 108 MMHG | BODY MASS INDEX: 30.94 KG/M2 | DIASTOLIC BLOOD PRESSURE: 68 MMHG | HEART RATE: 70 BPM | WEIGHT: 181.22 LBS | OXYGEN SATURATION: 96 % | TEMPERATURE: 98.1 F | HEIGHT: 64 IN

## 2023-11-27 DIAGNOSIS — R00.2 HEART PALPITATIONS: ICD-10-CM

## 2023-11-27 LAB
ALBUMIN SERPL BCP-MCNC: 4.3 G/DL (ref 3.2–4.9)
ALBUMIN/GLOB SERPL: 1.4 G/DL
ALP SERPL-CCNC: 64 U/L (ref 30–99)
ALT SERPL-CCNC: 18 U/L (ref 2–50)
ANION GAP SERPL CALC-SCNC: 9 MMOL/L (ref 7–16)
AST SERPL-CCNC: 19 U/L (ref 12–45)
BASOPHILS # BLD AUTO: 0.8 % (ref 0–1.8)
BASOPHILS # BLD: 0.06 K/UL (ref 0–0.12)
BILIRUB SERPL-MCNC: 0.3 MG/DL (ref 0.1–1.5)
BUN SERPL-MCNC: 9 MG/DL (ref 8–22)
CALCIUM ALBUM COR SERPL-MCNC: 8.5 MG/DL (ref 8.5–10.5)
CALCIUM SERPL-MCNC: 8.7 MG/DL (ref 8.5–10.5)
CHLORIDE SERPL-SCNC: 107 MMOL/L (ref 96–112)
CO2 SERPL-SCNC: 21 MMOL/L (ref 20–33)
CREAT SERPL-MCNC: 0.52 MG/DL (ref 0.5–1.4)
EKG IMPRESSION: NORMAL
EOSINOPHIL # BLD AUTO: 0.13 K/UL (ref 0–0.51)
EOSINOPHIL NFR BLD: 1.7 % (ref 0–6.9)
ERYTHROCYTE [DISTWIDTH] IN BLOOD BY AUTOMATED COUNT: 48.4 FL (ref 35.9–50)
GFR SERPLBLD CREATININE-BSD FMLA CKD-EPI: 129 ML/MIN/1.73 M 2
GLOBULIN SER CALC-MCNC: 3 G/DL (ref 1.9–3.5)
GLUCOSE SERPL-MCNC: 93 MG/DL (ref 65–99)
HCT VFR BLD AUTO: 38.1 % (ref 37–47)
HGB BLD-MCNC: 12.1 G/DL (ref 12–16)
IMM GRANULOCYTES # BLD AUTO: 0.02 K/UL (ref 0–0.11)
IMM GRANULOCYTES NFR BLD AUTO: 0.3 % (ref 0–0.9)
LYMPHOCYTES # BLD AUTO: 2.64 K/UL (ref 1–4.8)
LYMPHOCYTES NFR BLD: 35 % (ref 22–41)
MCH RBC QN AUTO: 25.6 PG (ref 27–33)
MCHC RBC AUTO-ENTMCNC: 31.8 G/DL (ref 32.2–35.5)
MCV RBC AUTO: 80.7 FL (ref 81.4–97.8)
MONOCYTES # BLD AUTO: 0.44 K/UL (ref 0–0.85)
MONOCYTES NFR BLD AUTO: 5.8 % (ref 0–13.4)
NEUTROPHILS # BLD AUTO: 4.26 K/UL (ref 1.82–7.42)
NEUTROPHILS NFR BLD: 56.4 % (ref 44–72)
NRBC # BLD AUTO: 0 K/UL
NRBC BLD-RTO: 0 /100 WBC (ref 0–0.2)
PLATELET # BLD AUTO: 307 K/UL (ref 164–446)
PMV BLD AUTO: 11 FL (ref 9–12.9)
POTASSIUM SERPL-SCNC: 3.9 MMOL/L (ref 3.6–5.5)
PROT SERPL-MCNC: 7.3 G/DL (ref 6–8.2)
RBC # BLD AUTO: 4.72 M/UL (ref 4.2–5.4)
SODIUM SERPL-SCNC: 137 MMOL/L (ref 135–145)
TROPONIN T SERPL-MCNC: <6 NG/L (ref 6–19)
WBC # BLD AUTO: 7.6 K/UL (ref 4.8–10.8)

## 2023-11-27 PROCEDURE — 80053 COMPREHEN METABOLIC PANEL: CPT

## 2023-11-27 PROCEDURE — 85025 COMPLETE CBC W/AUTO DIFF WBC: CPT

## 2023-11-27 PROCEDURE — 36415 COLL VENOUS BLD VENIPUNCTURE: CPT

## 2023-11-27 PROCEDURE — 93005 ELECTROCARDIOGRAM TRACING: CPT | Performed by: EMERGENCY MEDICINE

## 2023-11-27 PROCEDURE — 99283 EMERGENCY DEPT VISIT LOW MDM: CPT

## 2023-11-27 PROCEDURE — 93005 ELECTROCARDIOGRAM TRACING: CPT

## 2023-11-27 PROCEDURE — 84484 ASSAY OF TROPONIN QUANT: CPT

## 2023-11-27 PROCEDURE — 71045 X-RAY EXAM CHEST 1 VIEW: CPT

## 2023-11-27 RX ORDER — ALBUTEROL SULFATE 90 UG/1
2 AEROSOL, METERED RESPIRATORY (INHALATION) EVERY 6 HOURS PRN
Qty: 8.5 G | Refills: 0 | Status: SHIPPED | OUTPATIENT
Start: 2023-11-27

## 2023-11-27 RX ORDER — IBUPROFEN 800 MG/1
800 TABLET ORAL EVERY 8 HOURS PRN
Qty: 30 TABLET | Refills: 0 | Status: SHIPPED | OUTPATIENT
Start: 2023-11-27

## 2023-11-27 ASSESSMENT — PAIN DESCRIPTION - PAIN TYPE: TYPE: ACUTE PAIN

## 2023-11-27 ASSESSMENT — FIBROSIS 4 INDEX: FIB4 SCORE: 0.28

## 2023-11-27 NOTE — ED NOTES
The patient has been provided with discharge education and information.  The patient was also provided with instructions on follow up care and return precautions.  The patient verbalizes understanding of discharge instructions, follow up care, and return precautions.  All questions have been answered.  No RX prescribed by ERP.  NAD, A/Ox4, good color and appropriate at time of discharge.  Patient ambulated out of department.

## 2023-11-27 NOTE — ED TRIAGE NOTES
"Chief Complaint   Patient presents with    Chest Pain     X4 days. Pt states the pain will come and go throughout the day and that she will have associated SOB when the pain gets worse       Pt ambulatory into triage for above. Pt states she also feels like her heartbeat is \"irregular\" and that she can \"feel her heartbeat in her chest at times\". Pt speaking full sentences, equal chest rise and fall. Pt states chest pain feels like pressure and does not radiate anywhere. Pt aox4, gcs 15    /80   Pulse 72   Temp 36.7 °C (98.1 °F) (Temporal)   Resp 16   Ht 1.626 m (5' 4\")   Wt 82.2 kg (181 lb 3.5 oz)   LMP 10/01/2023 (Approximate)   SpO2 100%   BMI 31.11 kg/m²     "

## 2023-11-27 NOTE — ED PROVIDER NOTES
ED Provider Note    CHIEF COMPLAINT  Chief Complaint   Patient presents with    Chest Pain     X4 days. Pt states the pain will come and go throughout the day and that she will have associated SOB when the pain gets worse       EXTERNAL RECORDS REVIEWED  Outpatient.  Same-day surgery    HPI/ROS  LIMITATION TO HISTORY   None  OUTSIDE HISTORIAN(S):  None    Tricia Rees is a 28 y.o. female who presents here for evaluation of heart palpitations.  Patient states she has heart palpitations, that caused her to feel her heartbeat, every few minutes.  Patient states that she has no chest pain, but does complain some intermittent tightness, over the last 4 days.  She has no shortness of breath, no abdominal pain, no headache, no neck pain.    PAST MEDICAL HISTORY   has a past medical history of  treated with cerclage at 14 weeks gestation (1/12/2012), Gestational diabetes, HBsAg (hepatitis B surface antigen) positive, currently pregnant (HCC) (1/18/2012), Incompetent cervix in pregnancy (1/12/2012), and Powell cerclage present (1/12/2012).    SURGICAL HISTORY   has a past surgical history that includes cervical cerclage (10/01/2011); cervical cerclage (03/24/2012); primary c section (03/25/2012); cervical cerclage (07/12/2019); cervical cerclage (2015); cervical cerclage (2021); and lap,tubal cautery (Bilateral, 8/30/2022).    FAMILY HISTORY  Family History   Problem Relation Age of Onset    Diabetes Father         NIDDM       SOCIAL HISTORY  Social History     Tobacco Use    Smoking status: Never    Smokeless tobacco: Never   Vaping Use    Vaping Use: Former    Quit date: 1/1/2019    Substances: CBD   Substance and Sexual Activity    Alcohol use: Not Currently     Comment: Occasionally. 8/26/22: Quit in 2019    Drug use: Not Currently     Types: Inhaled     Comment: THC for menstral pains >2 years ago 12/2019.  Used to smoke Marijuana, quit in 2019    Sexual activity: Not Currently     Partners: Male      "Comment: unplanned pregnancy FOB involved       CURRENT MEDICATIONS  Home Medications       Reviewed by Nani Brenner R.N. (Registered Nurse) on 11/27/23 at 0829  Med List Status: Partial     Medication Last Dose Status   acetaminophen (TYLENOL) 500 MG Tab  Active   ACYCLOVIR PO  Active                    ALLERGIES  No Known Allergies    PHYSICAL EXAM  VITAL SIGNS: /68   Pulse 70   Temp 36.7 °C (98.1 °F) (Temporal)   Resp 19   Ht 1.626 m (5' 4\")   Wt 82.2 kg (181 lb 3.5 oz)   LMP 10/01/2023 (Approximate)   SpO2 96%   BMI 31.11 kg/m²    Constitutional: Well developed, well nourished. No acute distress.  HEENT: Normocephalic, atraumatic. Posterior pharynx clear and moist.  Eyes:  EOMI. Normal sclera.  Neck: Supple, Full range of motion, nontender.  Chest/Pulmonary: clear to ausculation. Symmetrical expansion.   Cardio: Regular rate and rhythm with no murmur.   Abdomen: Soft, nontender. No peritoneal signs. No guarding. No palpable masses.  Musculoskeletal: No deformity, no edema, neurovascular intact.   Neuro: Clear speech, appropriate, cooperative, cranial nerves II-XII grossly intact.  Psych: Normal mood and affect      DIAGNOSTIC STUDIES / PROCEDURES  Results for orders placed or performed during the hospital encounter of 11/27/23   CBC w/ Differential   Result Value Ref Range    WBC 7.6 4.8 - 10.8 K/uL    RBC 4.72 4.20 - 5.40 M/uL    Hemoglobin 12.1 12.0 - 16.0 g/dL    Hematocrit 38.1 37.0 - 47.0 %    MCV 80.7 (L) 81.4 - 97.8 fL    MCH 25.6 (L) 27.0 - 33.0 pg    MCHC 31.8 (L) 32.2 - 35.5 g/dL    RDW 48.4 35.9 - 50.0 fL    Platelet Count 307 164 - 446 K/uL    MPV 11.0 9.0 - 12.9 fL    Neutrophils-Polys 56.40 44.00 - 72.00 %    Lymphocytes 35.00 22.00 - 41.00 %    Monocytes 5.80 0.00 - 13.40 %    Eosinophils 1.70 0.00 - 6.90 %    Basophils 0.80 0.00 - 1.80 %    Immature Granulocytes 0.30 0.00 - 0.90 %    Nucleated RBC 0.00 0.00 - 0.20 /100 WBC    Neutrophils (Absolute) 4.26 1.82 - 7.42 K/uL    Lymphs " (Absolute) 2.64 1.00 - 4.80 K/uL    Monos (Absolute) 0.44 0.00 - 0.85 K/uL    Eos (Absolute) 0.13 0.00 - 0.51 K/uL    Baso (Absolute) 0.06 0.00 - 0.12 K/uL    Immature Granulocytes (abs) 0.02 0.00 - 0.11 K/uL    NRBC (Absolute) 0.00 K/uL   Complete Metabolic Panel (CMP)   Result Value Ref Range    Sodium 137 135 - 145 mmol/L    Potassium 3.9 3.6 - 5.5 mmol/L    Chloride 107 96 - 112 mmol/L    Co2 21 20 - 33 mmol/L    Anion Gap 9.0 7.0 - 16.0    Glucose 93 65 - 99 mg/dL    Bun 9 8 - 22 mg/dL    Creatinine 0.52 0.50 - 1.40 mg/dL    Calcium 8.7 8.5 - 10.5 mg/dL    Correct Calcium 8.5 8.5 - 10.5 mg/dL    AST(SGOT) 19 12 - 45 U/L    ALT(SGPT) 18 2 - 50 U/L    Alkaline Phosphatase 64 30 - 99 U/L    Total Bilirubin 0.3 0.1 - 1.5 mg/dL    Albumin 4.3 3.2 - 4.9 g/dL    Total Protein 7.3 6.0 - 8.2 g/dL    Globulin 3.0 1.9 - 3.5 g/dL    A-G Ratio 1.4 g/dL   Troponin - STAT Once   Result Value Ref Range    Troponin T <6 6 - 19 ng/L   ESTIMATED GFR   Result Value Ref Range    GFR (CKD-EPI) 129 >60 mL/min/1.73 m 2   EKG   Result Value Ref Range    Report       [facility] Emergency Dept.    Test Date:  2023  Pt Name:    SHERLYN JIMENEZ               Department: ER  MRN:        [id number]                      Room:  Gender:     Female                       Technician: [id number]  :        [id number]                   Requested By:ER TRIAGE PROTOCOL  Order #:    [id number]                    Reading MD:    Measurements  Intervals                                Axis  Rate:       73                           P:          -41  [place]:         136                          QRS:        36  QRSD:       97                           T:          11  QT:         398  QTc:        439    Interpretive Statements  Sinus rhythm  Compared to ECG 2022 23:59:09  No significant changes     EKG; normal sinus rhythm rate of 73.  No ST elevation, no ST depression.  QTc is 439.  Compared to EKG from 2022.      RADIOLOGY  I have  independently interpreted the diagnostic imaging associated with this visit and am waiting the final reading from the radiologist.   My preliminary interpretation is as follows: See below  Radiologist interpretation:   DX-CHEST-PORTABLE (1 VIEW)   Final Result         1. No acute cardiopulmonary abnormalities are identified.            COURSE & MEDICAL DECISION MAKING    Patient be discharged in stable condition    INITIAL ASSESSMENT, COURSE AND PLAN  Care Narrative: This is a 28-year-old female here for evaluation of heart palpitations.  Cardiac workup is negative, she is PERC negative, and has no acute finding.  She has a heart score of 1.  Patient will follow-up as outpatient, return for any further issues or concerns.  She will decrease her caffeine, increase her sleep, and decrease her stress.    DISPOSITION AND DISCUSSIONS  I have discussed management of the patient with the following physicians and DESIRE's: None    Discussion of management with other Q or appropriate source(s): None    Escalation of care considered, and ultimately not performed: IV fluids not indicated as patient is not dehydrated or vomiting    Barriers to care at this time, including but not limited to: Patient does not have established PCP.     Decision tools and prescription drugs considered including, but not limited to: None.    FINAL DIAGNOSIS  1. Heart palpitations           Electronically signed by: Sreedhar Mckeon D.O., 11/27/2023 2:30 PM

## 2024-01-15 ENCOUNTER — HOSPITAL ENCOUNTER (OUTPATIENT)
Dept: LAB | Facility: MEDICAL CENTER | Age: 29
End: 2024-01-15
Attending: INTERNAL MEDICINE
Payer: COMMERCIAL

## 2024-01-15 PROCEDURE — 86480 TB TEST CELL IMMUN MEASURE: CPT

## 2024-01-15 PROCEDURE — 36415 COLL VENOUS BLD VENIPUNCTURE: CPT

## 2024-01-16 LAB
GAMMA INTERFERON BACKGROUND BLD IA-ACNC: 0.09 IU/ML
M TB IFN-G BLD-IMP: NEGATIVE
M TB IFN-G CD4+ BCKGRND COR BLD-ACNC: 0.07 IU/ML
MITOGEN IGNF BCKGRD COR BLD-ACNC: >10 IU/ML
QFT TB2 - NIL TBQ2: 0.03 IU/ML
